# Patient Record
Sex: MALE | Race: WHITE | NOT HISPANIC OR LATINO | Employment: FULL TIME | URBAN - NONMETROPOLITAN AREA
[De-identification: names, ages, dates, MRNs, and addresses within clinical notes are randomized per-mention and may not be internally consistent; named-entity substitution may affect disease eponyms.]

---

## 2017-04-14 ENCOUNTER — COMMUNICATION - GICH (OUTPATIENT)
Dept: FAMILY MEDICINE | Facility: OTHER | Age: 52
End: 2017-04-14

## 2017-04-14 DIAGNOSIS — F52.8 OTHER SEXUAL DYSFUNCTION NOT DUE TO A SUBSTANCE OR KNOWN PHYSIOLOGICAL CONDITION: ICD-10-CM

## 2017-09-18 ENCOUNTER — COMMUNICATION - GICH (OUTPATIENT)
Dept: FAMILY MEDICINE | Facility: OTHER | Age: 52
End: 2017-09-18

## 2017-09-18 DIAGNOSIS — F52.8 OTHER SEXUAL DYSFUNCTION NOT DUE TO A SUBSTANCE OR KNOWN PHYSIOLOGICAL CONDITION: ICD-10-CM

## 2018-01-04 NOTE — TELEPHONE ENCOUNTER
Patient Information     Patient Name MRNarendra Rodríguez [id number] Male 1965      Telephone Encounter by Kerry Carpio RN at 2017 12:49 PM     Author:  Kerry Carpio RN Service:  (none) Author Type:  NURS- Registered Nurse     Filed:  2017 12:50 PM Encounter Date:  2017 Status:  Signed     :  Kerry Carpio RN (NURS- Registered Nurse)            This is a Refill request from: [facility]  Name of Medication: CIALIS 5 mg tablet  TAKE 1 TABLET BY MOUTH EVERY DAY AS NEEDED FOR ED *TAKE 30 MINUTES BEFORE SEXUAL   Quantity requested: 10  Last fill date: 16  Due for refill:   Last visit with MARTHA KING was on: 2013 in [facility]  PCP:  Martha King MD  Controlled Substance Agreement:  na   Diagnosis r/t this medication request: Psychosexual dysfunction with inhibited sexual excitement      Patient has not been seen since  RN unable to refill     Unable to complete prescription refill per RN Medication Refill Policy.................... KERRY CARPIO RN ....................  2017   12:49 PM

## 2018-01-29 ENCOUNTER — DOCUMENTATION ONLY (OUTPATIENT)
Dept: FAMILY MEDICINE | Facility: OTHER | Age: 53
End: 2018-01-29

## 2018-01-29 PROBLEM — F52.8 PSYCHOSEXUAL DYSFUNCTION WITH INHIBITED SEXUAL EXCITEMENT: Status: ACTIVE | Noted: 2018-01-29

## 2018-01-29 PROBLEM — M54.9 BACKACHE: Status: ACTIVE | Noted: 2018-01-29

## 2018-01-29 PROBLEM — G47.33 OBSTRUCTIVE SLEEP APNEA: Status: ACTIVE | Noted: 2018-01-29

## 2018-01-29 RX ORDER — TADALAFIL 5 MG/1
1 TABLET ORAL DAILY PRN
COMMUNITY
Start: 2017-04-14 | End: 2018-03-13

## 2018-01-29 RX ORDER — SILVER SULFADIAZINE 10 MG/G
3 CREAM TOPICAL DAILY
COMMUNITY
Start: 2013-12-27 | End: 2019-04-05

## 2018-03-13 DIAGNOSIS — F52.8 PSYCHOSEXUAL DYSFUNCTION WITH INHIBITED SEXUAL EXCITEMENT: Primary | ICD-10-CM

## 2018-03-16 RX ORDER — TADALAFIL 5 MG/1
5 TABLET ORAL DAILY PRN
Qty: 10 TABLET | Refills: 0 | Status: SHIPPED | OUTPATIENT
Start: 2018-03-16 | End: 2018-03-28

## 2018-03-16 NOTE — TELEPHONE ENCOUNTER
This is a Refill request from: [facility]  Name of Medication: CIALIS 5 mg tablet  TAKE 1 TABLET BY MOUTH EVERY DAY AS NEEDED FOR ED *TAKE 30 MINUTES BEFORE SEXUAL   Quantity requested:   Last fill date: 4/14/17 per chart review  Due for refill: yes  Last visit with MARTHA KING was on: 12/27/2013 in [facility]  PCP:  Martha King MD  Controlled Substance Agreement:  na   Diagnosis r/t this medication request: Psychosexual dysfunction with inhibited sexual excitement        Patient has not been seen since 2013 RN unable to refill    Kerry Carpio, RN on 3/16/2018 at 2:32 PM

## 2018-03-28 DIAGNOSIS — F52.8 PSYCHOSEXUAL DYSFUNCTION WITH INHIBITED SEXUAL EXCITEMENT: ICD-10-CM

## 2018-03-28 RX ORDER — TADALAFIL 5 MG/1
5 TABLET ORAL DAILY PRN
Qty: 10 TABLET | Refills: 0 | Status: SHIPPED | OUTPATIENT
Start: 2018-03-28 | End: 2019-04-05

## 2019-03-28 DIAGNOSIS — F52.8 PSYCHOSEXUAL DYSFUNCTION WITH INHIBITED SEXUAL EXCITEMENT: ICD-10-CM

## 2019-04-01 RX ORDER — TADALAFIL 5 MG/1
5 TABLET ORAL DAILY PRN
Qty: 10 TABLET | Refills: 0 | OUTPATIENT
Start: 2019-04-01

## 2019-04-01 NOTE — TELEPHONE ENCOUNTER
Refused. Was a patient of Dr. King. Has not been seen here since 2013. Needs to establish care. Zahida Downey RN on 4/1/2019 at 4:52 PM

## 2019-04-05 ENCOUNTER — OFFICE VISIT (OUTPATIENT)
Dept: FAMILY MEDICINE | Facility: OTHER | Age: 54
End: 2019-04-05
Attending: NURSE PRACTITIONER
Payer: COMMERCIAL

## 2019-04-05 VITALS
WEIGHT: 222.5 LBS | HEART RATE: 66 BPM | TEMPERATURE: 96.9 F | BODY MASS INDEX: 31.85 KG/M2 | HEIGHT: 70 IN | SYSTOLIC BLOOD PRESSURE: 122 MMHG | DIASTOLIC BLOOD PRESSURE: 84 MMHG | RESPIRATION RATE: 16 BRPM

## 2019-04-05 DIAGNOSIS — F52.8 PSYCHOSEXUAL DYSFUNCTION WITH INHIBITED SEXUAL EXCITEMENT: Primary | ICD-10-CM

## 2019-04-05 PROCEDURE — 99213 OFFICE O/P EST LOW 20 MIN: CPT | Performed by: NURSE PRACTITIONER

## 2019-04-05 RX ORDER — TADALAFIL 10 MG/1
10 TABLET ORAL DAILY PRN
Qty: 10 TABLET | Refills: 3 | Status: SHIPPED | OUTPATIENT
Start: 2019-04-05 | End: 2019-04-09

## 2019-04-05 ASSESSMENT — MIFFLIN-ST. JEOR: SCORE: 1870.5

## 2019-04-05 ASSESSMENT — PAIN SCALES - GENERAL: PAINLEVEL: NO PAIN (0)

## 2019-04-05 NOTE — PROGRESS NOTES
HPI:    Narendra Dee is a 53 year old male who presents to clinic today for medication management.  He was originally scheduled for a physical when he found out that I was not a physician and he opted to just get med management done and will plan to establish care with a new physician in the future.  The only medication he is currently taking Cialis.  He takes 5 mg daily as needed.  He is feeling like this is wearing off too quickly and is wondering if he can have an increased dose of this.  He denies any other concerns today.  Is not having any side effects from the medication.    Past Medical History:   Diagnosis Date     Backache     No Comments Provided     Obstructive sleep apnea     noncompliant to continuous positive airway pressure.  History of broken nose.     Vasectomy status     12/3/2010       Past Surgical History:   Procedure Laterality Date     VASECTOMY      No Comments Provided       Family History   Problem Relation Age of Onset     Seizure Disorder Sister         Seizures,seizure disorder     Other - See Comments Other         breast cancer in family     Breast Cancer Mother      Other - See Comments Father         quadriplegia, swimming accident       Social History     Socioeconomic History     Marital status:      Spouse name: Not on file     Number of children: Not on file     Years of education: Not on file     Highest education level: Not on file   Occupational History     Not on file   Social Needs     Financial resource strain: Not on file     Food insecurity:     Worry: Not on file     Inability: Not on file     Transportation needs:     Medical: Not on file     Non-medical: Not on file   Tobacco Use     Smoking status: Never Smoker     Smokeless tobacco: Current User     Types: Chew   Substance and Sexual Activity     Alcohol use: Yes     Alcohol/week: 2.5 oz     Drug use: No     Types: Other     Comment: Drug use: No     Sexual activity: Yes     Partners: Female   Lifestyle  "    Physical activity:     Days per week: Not on file     Minutes per session: Not on file     Stress: Not on file   Relationships     Social connections:     Talks on phone: Not on file     Gets together: Not on file     Attends Yazdanism service: Not on file     Active member of club or organization: Not on file     Attends meetings of clubs or organizations: Not on file     Relationship status: Not on file     Intimate partner violence:     Fear of current or ex partner: Not on file     Emotionally abused: Not on file     Physically abused: Not on file     Forced sexual activity: Not on file   Other Topics Concern     Not on file   Social History Narrative    Haja Son    Adam Son    , HX of army medic, 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 Denies abuse.  Infrequent use of alcohol.       Current Outpatient Medications   Medication Sig Dispense Refill     tadalafil (CIALIS) 10 MG tablet Take 1 tablet (10 mg) by mouth daily as needed (erctile dysfunction) 10 tablet 3       No Known Allergies    ROS:  Pertinent positives and negatives are noted in HPI.    EXAM:  /84 (BP Location: Right arm, Patient Position: Sitting, Cuff Size: Adult Regular)   Pulse 66   Temp 96.9  F (36.1  C) (Tympanic)   Resp 16   Ht 1.778 m (5' 10\")   Wt 100.9 kg (222 lb 8 oz)   BMI 31.93 kg/m    General appearance: well appearing male, in no acute distress  Respiratory: clear to auscultation bilaterally  Cardiac: RRR with no murmurs  Psychological: normal affect, alert and pleasant      ASSESSMENT AND PLAN:    1. Psychosexual dysfunction with inhibited sexual excitement      I did increase his Cialis to 10 mg daily as needed.  I gave him a prescription for 10 tablets with 3 refills.  He is due for routine physical with labs and colonoscopy.  He will schedule this at his convenience in the future.      Mady Lara..................4/5/2019 2:43 PM      This document was prepared using voice generated " software.  While every attempt was made for accuracy, grammatical errors may exist.

## 2019-04-05 NOTE — NURSING NOTE
Patient presents to clinic today for a physical and to get medication refilled.     No LMP for male patient.  Medication Reconciliation: complete    Aidee Paulson LPN  4/5/2019 2:09 PM

## 2019-04-08 ENCOUNTER — TELEPHONE (OUTPATIENT)
Dept: FAMILY MEDICINE | Facility: OTHER | Age: 54
End: 2019-04-08

## 2019-04-08 DIAGNOSIS — F52.8 PSYCHOSEXUAL DYSFUNCTION WITH INHIBITED SEXUAL EXCITEMENT: Primary | ICD-10-CM

## 2019-04-09 RX ORDER — TADALAFIL 20 MG/1
TABLET ORAL
Qty: 10 TABLET | Refills: 3 | Status: SHIPPED | OUTPATIENT
Start: 2019-04-09 | End: 2020-10-02

## 2019-04-09 NOTE — TELEPHONE ENCOUNTER
Patient's wife calling in regards to prescription that was sent for . She states it is going to cost well over $300. Pharmacy told her if dose was increased to 20mg for them to cut in half it would be much cheaper. I stated I would send message to Mady MAYES and give her a call back.     Aidee Paulson LPN...................4/9/2019  10:39 AM

## 2019-04-09 NOTE — TELEPHONE ENCOUNTER
Spoke with patient's wife and relayed message below.  Aidee Paulson LPN...................4/9/2019  10:49 AM

## 2019-12-05 ENCOUNTER — APPOINTMENT (OUTPATIENT)
Age: 54
Setting detail: DERMATOLOGY
End: 2019-12-05

## 2019-12-05 VITALS — RESPIRATION RATE: 15 BRPM | HEIGHT: 69 IN | WEIGHT: 208 LBS

## 2019-12-05 DIAGNOSIS — L82.1 OTHER SEBORRHEIC KERATOSIS: ICD-10-CM

## 2019-12-05 DIAGNOSIS — L82.0 INFLAMED SEBORRHEIC KERATOSIS: ICD-10-CM

## 2019-12-05 PROCEDURE — OTHER COUNSELING: OTHER

## 2019-12-05 PROCEDURE — 17110 DESTRUCT B9 LESION 1-14: CPT

## 2019-12-05 PROCEDURE — OTHER BENIGN DESTRUCTION: OTHER

## 2019-12-05 PROCEDURE — OTHER REASSURANCE: OTHER

## 2019-12-05 PROCEDURE — 99202 OFFICE O/P NEW SF 15 MIN: CPT | Mod: 25

## 2019-12-05 ASSESSMENT — LOCATION DETAILED DESCRIPTION DERM: LOCATION DETAILED: LEFT SUPERIOR PARIETAL SCALP

## 2019-12-05 ASSESSMENT — LOCATION SIMPLE DESCRIPTION DERM: LOCATION SIMPLE: SCALP

## 2019-12-05 ASSESSMENT — LOCATION ZONE DERM: LOCATION ZONE: SCALP

## 2019-12-05 NOTE — PROCEDURE: BENIGN DESTRUCTION
Detail Level: Detailed
Render Post-Care Instructions In Note?: no
Treatment Number (Will Not Render If 0): 0
Post-Care Instructions: I reviewed with the patient in detail post-care instructions. Patient is to wear sunprotection, and avoid picking at any of the treated lesions. Pt may apply Vaseline to crusted or scabbing areas.
Medical Necessity Clause: This procedure was medically necessary because the lesions that were treated were:
Consent: The patient's consent was obtained including but not limited to risks of crusting, scabbing, blistering, scarring, darker or lighter pigmentary change, recurrence, incomplete removal and infection.
Anesthesia Volume In Cc: 0.5
Medical Necessity Information: It is in your best interest to select a reason for this procedure from the list below. All of these items fulfill various CMS LCD requirements except the new and changing color options.

## 2020-09-24 DIAGNOSIS — F52.8 PSYCHOSEXUAL DYSFUNCTION WITH INHIBITED SEXUAL EXCITEMENT: ICD-10-CM

## 2020-09-28 RX ORDER — TADALAFIL 20 MG/1
TABLET ORAL
Qty: 10 TABLET | Refills: 0 | OUTPATIENT
Start: 2020-09-28

## 2020-09-28 NOTE — TELEPHONE ENCOUNTER
[facility] sent Rx request for the following:   tadalafil (CIALIS) 20 MG tablet  Sig:  TAKE 1/2 (ONE-HALF) TABLET BY MOUTH ONCE DAILY AS NEEDED    Last Prescription Date:   4/9/2019  Last Fill Qty/Refills:         10, R-3    Last Office Visit:              4/5/2019   Future Office visit:           None  Failed:  Erectile Dysfuction Protocol Failed  Recent (12 mo) or future (30 days) visit within the authorizing provider's specialty     As per OV 4/5/2019 Pt is to be seen.      Will refuse at this time with note to pharmacy    Lyla Rodriguez RN  ....................  9/28/2020   1:58 PM

## 2020-10-02 ENCOUNTER — OFFICE VISIT (OUTPATIENT)
Dept: FAMILY MEDICINE | Facility: OTHER | Age: 55
End: 2020-10-02
Attending: PHYSICIAN ASSISTANT
Payer: COMMERCIAL

## 2020-10-02 VITALS
OXYGEN SATURATION: 96 % | DIASTOLIC BLOOD PRESSURE: 72 MMHG | TEMPERATURE: 97.9 F | HEART RATE: 70 BPM | BODY MASS INDEX: 31.45 KG/M2 | SYSTOLIC BLOOD PRESSURE: 128 MMHG | RESPIRATION RATE: 14 BRPM | WEIGHT: 219.2 LBS

## 2020-10-02 DIAGNOSIS — F52.8 PSYCHOSEXUAL DYSFUNCTION WITH INHIBITED SEXUAL EXCITEMENT: ICD-10-CM

## 2020-10-02 DIAGNOSIS — Z00.00 LABORATORY EXAMINATION ORDERED AS PART OF A ROUTINE GENERAL MEDICAL EXAMINATION: Primary | ICD-10-CM

## 2020-10-02 PROCEDURE — 99213 OFFICE O/P EST LOW 20 MIN: CPT | Performed by: PHYSICIAN ASSISTANT

## 2020-10-02 PROCEDURE — G0463 HOSPITAL OUTPT CLINIC VISIT: HCPCS

## 2020-10-02 PROCEDURE — G0463 HOSPITAL OUTPT CLINIC VISIT: HCPCS | Mod: 25

## 2020-10-02 RX ORDER — TADALAFIL 20 MG/1
TABLET ORAL
Qty: 10 TABLET | Refills: 3 | Status: SHIPPED | OUTPATIENT
Start: 2020-10-02 | End: 2021-12-23

## 2020-10-02 ASSESSMENT — PATIENT HEALTH QUESTIONNAIRE - PHQ9: 5. POOR APPETITE OR OVEREATING: NOT AT ALL

## 2020-10-02 ASSESSMENT — ANXIETY QUESTIONNAIRES
6. BECOMING EASILY ANNOYED OR IRRITABLE: NOT AT ALL
GAD7 TOTAL SCORE: 0
2. NOT BEING ABLE TO STOP OR CONTROL WORRYING: NOT AT ALL
IF YOU CHECKED OFF ANY PROBLEMS ON THIS QUESTIONNAIRE, HOW DIFFICULT HAVE THESE PROBLEMS MADE IT FOR YOU TO DO YOUR WORK, TAKE CARE OF THINGS AT HOME, OR GET ALONG WITH OTHER PEOPLE: NOT DIFFICULT AT ALL
3. WORRYING TOO MUCH ABOUT DIFFERENT THINGS: NOT AT ALL
1. FEELING NERVOUS, ANXIOUS, OR ON EDGE: NOT AT ALL
5. BEING SO RESTLESS THAT IT IS HARD TO SIT STILL: NOT AT ALL
7. FEELING AFRAID AS IF SOMETHING AWFUL MIGHT HAPPEN: NOT AT ALL

## 2020-10-02 ASSESSMENT — PAIN SCALES - GENERAL: PAINLEVEL: NO PAIN (0)

## 2020-10-02 NOTE — PROGRESS NOTES
SUBJECTIVE:   Narendra Dee is a 54 year old male here for the following health issues:    HPI  Patient comes in for refill of tadalafil.  No side effects and usually breaks them in half and states this dose is perfect for him.  Still working well for him.      Also, he has not had a yearly physical exam or lab work in several years.  He is requesting lab only appointment and will make a yearly physical exam appointment shortly.    States he is recently put on a little bit of weight is resting heart rate is now 70 when it used to be in the low 50s and even 45.  No other changes to shortness of breath, chest pain, tachycardia, changes in appetite, changes in stool.    GAD7  SHAINA-7 SCORE 10/2/2020   Total Score 0     PHQ9  No flowsheet data found.    Allergies:  No Known Allergies    Review of Systems   As above otherwise ROS is unremarkable.     OBJECTIVE:     Vitals:    10/02/20 0901   BP: 128/72   Pulse: 70   Resp: 14   Temp: 97.9  F (36.6  C)   SpO2: 96%   Weight: 99.4 kg (219 lb 3.2 oz)       Physical Exam  General Appearance: Pleasant, alert, appropriate appearance for age and circumstances, no acute distress  Head: Normocephalic, atraumatic  Psychiatric Exam: Alert and oriented, appropriate affect    ASSESSMENT/PLAN:     1. Laboratory examination ordered as part of a routine general medical examination    2. Psychosexual dysfunction with inhibited sexual excitement        Told patient to make a lab only appointment for yearly lab work and he will.    Refilled his tadalafil.    He is going to follow-up by making a yearly medical exam appointment in the near future.    He was in a hurry to get back to work.    TED Arora  [facility]    This document was prepared using voice generated software.  While every attempt was made for accuracy, grammatical errors may exist.

## 2020-10-03 ASSESSMENT — ANXIETY QUESTIONNAIRES: GAD7 TOTAL SCORE: 0

## 2021-12-16 DIAGNOSIS — F52.8 PSYCHOSEXUAL DYSFUNCTION WITH INHIBITED SEXUAL EXCITEMENT: ICD-10-CM

## 2021-12-20 RX ORDER — TADALAFIL 20 MG/1
TABLET ORAL
Qty: 10 TABLET | Refills: 0 | OUTPATIENT
Start: 2021-12-20

## 2021-12-20 NOTE — TELEPHONE ENCOUNTER
"[id number] sent Rx request for the following:      Requested Prescriptions   Pending Prescriptions Disp Refills     tadalafil (CIALIS) 20 MG tablet [Pharmacy Med Name: Tadalafil 20 MG Oral Tablet] 10 tablet 0     Sig: TAKE 1/2 (ONE-HALF) TABLET BY MOUTH ONCE DAILY AS NEEDED       Erectile Dysfuction Protocol Failed - 12/20/2021  1:55 PM        Failed - Recent (12 mo) or future (30 days) visit within the authorizing provider's specialty     Patient has had an office visit with the authorizing provider or a provider within the authorizing providers department within the previous 12 mos or has a future within next 30 days. See \"Patient Info\" tab in inbasket, or \"Choose Columns\" in Meds & Orders section of the refill encounter.                Last Prescription Date:   10/2/2020   Last Fill Qty/Refills:         10, R-3    Last Office Visit:              10/2/2020  Future Office visit:           None  Routing refill request to provider for review/approval because:    Does not meet RN refill criteria as patient has not had a visit in the last year. Mireya Escoto RN on 12/20/2021 at 2:20 PM            "

## 2021-12-23 ENCOUNTER — OFFICE VISIT (OUTPATIENT)
Dept: FAMILY MEDICINE | Facility: OTHER | Age: 56
End: 2021-12-23
Attending: FAMILY MEDICINE
Payer: COMMERCIAL

## 2021-12-23 VITALS
RESPIRATION RATE: 16 BRPM | DIASTOLIC BLOOD PRESSURE: 88 MMHG | HEART RATE: 72 BPM | HEIGHT: 70 IN | SYSTOLIC BLOOD PRESSURE: 136 MMHG | OXYGEN SATURATION: 97 % | BODY MASS INDEX: 31.84 KG/M2 | WEIGHT: 222.4 LBS | TEMPERATURE: 97.6 F

## 2021-12-23 DIAGNOSIS — F52.8 PSYCHOSEXUAL DYSFUNCTION WITH INHIBITED SEXUAL EXCITEMENT: ICD-10-CM

## 2021-12-23 PROCEDURE — 99212 OFFICE O/P EST SF 10 MIN: CPT | Performed by: FAMILY MEDICINE

## 2021-12-23 RX ORDER — TADALAFIL 20 MG/1
TABLET ORAL
Qty: 10 TABLET | Refills: 11 | Status: SHIPPED | OUTPATIENT
Start: 2021-12-23 | End: 2023-02-21

## 2021-12-23 ASSESSMENT — PAIN SCALES - GENERAL: PAINLEVEL: NO PAIN (0)

## 2021-12-23 ASSESSMENT — MIFFLIN-ST. JEOR: SCORE: 1855.05

## 2021-12-23 NOTE — NURSING NOTE
Patient here for medication refill. No concerns today. Medication Reconciliation: complete.    Breann Negron LPN  12/23/2021 9:54 AM

## 2021-12-23 NOTE — PROGRESS NOTES
"  SUBJECTIVE:   Narendra Dee is a 56 year old male who presents to clinic today for the following health issues:      Patient arrives here for Cialis refill.  Otherwise no complaints or concerns.  Plans on making a appointment for physical sometime next year        Patient Active Problem List    Diagnosis Date Noted     Backache 01/29/2018     Priority: Medium     Overview:   mechanical       Psychosexual dysfunction with inhibited sexual excitement 01/29/2018     Priority: Medium     Obstructive sleep apnea 01/29/2018     Priority: Medium     Tobacco abuse 12/02/2011     Priority: Medium     Overview:   Spit tobacco       Past Medical History:   Diagnosis Date     Backache     No Comments Provided     Obstructive sleep apnea     noncompliant to continuous positive airway pressure.  History of broken nose.     Vasectomy status     12/3/2010      No Known Allergies    Review of Systems     OBJECTIVE:     /88 (BP Location: Right arm)   Pulse 72   Temp 97.6  F (36.4  C)   Resp 16   Ht 1.778 m (5' 10\")   Wt 100.9 kg (222 lb 6.4 oz)   SpO2 97%   BMI 31.91 kg/m    Body mass index is 31.91 kg/m .  Physical Exam  Neurological:      Mental Status: He is alert.   Psychiatric:         Mood and Affect: Mood normal.         Diagnostic Test Results:  none     ASSESSMENT/PLAN:         (F52.8) Psychosexual dysfunction with inhibited sexual excitement  Comment: Refill  Plan: tadalafil (CIALIS) 20 MG tablet       Also reviewed immunization status colonoscopy status.  Patient would like to wait until later next year to address these        Narendra Reyes MD  [facility]  "

## 2022-05-31 ENCOUNTER — APPOINTMENT (OUTPATIENT)
Dept: URBAN - METROPOLITAN AREA CLINIC 256 | Age: 57
Setting detail: DERMATOLOGY
End: 2022-05-31

## 2022-05-31 DIAGNOSIS — L82.0 INFLAMED SEBORRHEIC KERATOSIS: ICD-10-CM

## 2022-05-31 PROCEDURE — 17110 DESTRUCT B9 LESION 1-14: CPT

## 2022-05-31 PROCEDURE — OTHER COUNSELING: OTHER

## 2022-05-31 PROCEDURE — OTHER MIPS QUALITY: OTHER

## 2022-05-31 PROCEDURE — OTHER LIQUID NITROGEN: OTHER

## 2022-05-31 ASSESSMENT — LOCATION SIMPLE DESCRIPTION DERM: LOCATION SIMPLE: SCALP

## 2022-05-31 ASSESSMENT — LOCATION DETAILED DESCRIPTION DERM
LOCATION DETAILED: LEFT SUPERIOR PARIETAL SCALP
LOCATION DETAILED: LEFT CENTRAL PARIETAL SCALP

## 2022-05-31 ASSESSMENT — LOCATION ZONE DERM: LOCATION ZONE: SCALP

## 2022-05-31 NOTE — PROCEDURE: LIQUID NITROGEN
Show Aperture Variable?: Yes
Add 52 Modifier (Optional): no
Consent: The patient's consent was obtained including but not limited to risks of crusting, scabbing, blistering, scarring, darker or lighter pigmentary change, recurrence, incomplete removal and infection.
Number Of Freeze-Thaw Cycles: 3 freeze-thaw cycles
Detail Level: Detailed
Duration Of Freeze Thaw-Cycle (Seconds): 5-10
Medical Necessity Clause: This procedure was medically necessary because the lesions that were treated were:
Post-Care Instructions: I reviewed with the patient in detail post-care instructions. Patient is to wear sunprotection, and avoid picking at any of the treated lesions. Pt may apply Vaseline to crusted or scabbing areas.
Medical Necessity Information: It is in your best interest to select a reason for this procedure from the list below. All of these items fulfill various CMS LCD requirements except the new and changing color options.
Spray Paint Text: The liquid nitrogen was applied to the skin utilizing a spray paint frosting technique.

## 2023-02-15 DIAGNOSIS — F52.8 OTHER SEXUAL DYSFUNCTION NOT DUE TO A SUBSTANCE OR KNOWN PHYSIOLOGICAL CONDITION: ICD-10-CM

## 2023-02-17 NOTE — TELEPHONE ENCOUNTER
" Disp Refills Start End LYNDSEY   tadalafil (CIALIS) 20 MG tablet 10 tablet 11 12/23/2021  No   Sig: Take half tablet daily as needed         LOV: 12/23/2021  Future Office visit: No future appointment scheduled at this time.    Routing refill request to provider for review/approval.  Overdue for annual appointment. Routed to scheduling to get patient scheduled. Nallely Li RN  ....................  2/17/2023   3:59 PM          Requested Prescriptions   Pending Prescriptions Disp Refills     tadalafil (CIALIS) 20 MG tablet [Pharmacy Med Name: Tadalafil 20 MG Oral Tablet] 10 tablet 0     Sig: TAKE 1/2 (ONE-HALF) TABLET BY MOUTH ONCE DAILY AS NEEDED       Erectile Dysfuction Protocol Failed - 2/15/2023 11:23 AM        Failed - Recent (12 mo) or future (30 days) visit within the authorizing provider's specialty     Patient has had an office visit with the authorizing provider or a provider within the authorizing providers department within the previous 12 mos or has a future within next 30 days. See \"Patient Info\" tab in inbasket, or \"Choose Columns\" in Meds & Orders section of the refill encounter.              Passed - Absence of nitrates on medication list        Passed - Absence of Alpha Blockers on Med list        Passed - Medication is active on med list        Passed - Patient is age 18 or older           Routed to provider for review and consideration. Nallely Li RN  ....................  2/17/2023   3:55 PM      "

## 2023-02-21 RX ORDER — TADALAFIL 20 MG/1
TABLET ORAL
Qty: 10 TABLET | Refills: 0 | Status: SHIPPED | OUTPATIENT
Start: 2023-02-21 | End: 2023-08-07

## 2023-02-21 NOTE — TELEPHONE ENCOUNTER
Talked to patient. He is aware that he is due for an annual exam. Declined to schedule at this time.    Sara Banda on 2/21/2023 at 11:27 AM

## 2023-03-07 ENCOUNTER — APPOINTMENT (OUTPATIENT)
Dept: URBAN - METROPOLITAN AREA CLINIC 256 | Age: 58
Setting detail: DERMATOLOGY
End: 2023-03-08

## 2023-03-07 VITALS — WEIGHT: 205 LBS | HEIGHT: 70 IN

## 2023-03-07 DIAGNOSIS — D22 MELANOCYTIC NEVI: ICD-10-CM

## 2023-03-07 DIAGNOSIS — L82.0 INFLAMED SEBORRHEIC KERATOSIS: ICD-10-CM

## 2023-03-07 DIAGNOSIS — L21.8 OTHER SEBORRHEIC DERMATITIS: ICD-10-CM

## 2023-03-07 PROBLEM — D22.4 MELANOCYTIC NEVI OF SCALP AND NECK: Status: ACTIVE | Noted: 2023-03-07

## 2023-03-07 PROCEDURE — OTHER PRESCRIPTION MEDICATION MANAGEMENT: OTHER

## 2023-03-07 PROCEDURE — 17110 DESTRUCT B9 LESION 1-14: CPT

## 2023-03-07 PROCEDURE — OTHER PRESCRIPTION: OTHER

## 2023-03-07 PROCEDURE — 99213 OFFICE O/P EST LOW 20 MIN: CPT | Mod: 25

## 2023-03-07 PROCEDURE — OTHER COUNSELING: OTHER

## 2023-03-07 PROCEDURE — OTHER MIPS QUALITY: OTHER

## 2023-03-07 PROCEDURE — OTHER LIQUID NITROGEN: OTHER

## 2023-03-07 RX ORDER — BETAMETHASONE DIPROPIONATE 0.5 MG/ML
0.05% LOTION TOPICAL QD
Qty: 60 | Refills: 3 | Status: ERX | COMMUNITY
Start: 2023-03-07

## 2023-03-07 ASSESSMENT — LOCATION SIMPLE DESCRIPTION DERM
LOCATION SIMPLE: POSTERIOR NECK
LOCATION SIMPLE: POSTERIOR SCALP
LOCATION SIMPLE: RIGHT FOREHEAD

## 2023-03-07 ASSESSMENT — LOCATION DETAILED DESCRIPTION DERM
LOCATION DETAILED: MID POSTERIOR NECK
LOCATION DETAILED: POSTERIOR MID-PARIETAL SCALP
LOCATION DETAILED: RIGHT SUPERIOR OCCIPITAL SCALP
LOCATION DETAILED: RIGHT SUPERIOR MEDIAL FOREHEAD

## 2023-03-07 ASSESSMENT — LOCATION ZONE DERM
LOCATION ZONE: SCALP
LOCATION ZONE: FACE
LOCATION ZONE: NECK

## 2023-03-07 NOTE — PROCEDURE: PRESCRIPTION MEDICATION MANAGEMENT
Detail Level: Zone
Render In Strict Bullet Format?: No
Initiate Treatment: Betamethasone lotion. Apply once daily to scalp at night for up to 14 days per month. Repeat as needed for flares.

## 2023-03-07 NOTE — PROCEDURE: LIQUID NITROGEN
Duration Of Freeze Thaw-Cycle (Seconds): 5-10
Post-Care Instructions: I reviewed with the patient in detail post-care instructions. Patient is to wear sunprotection, and avoid picking at any of the treated lesions. Pt may apply Vaseline to crusted or scabbing areas.
Spray Paint Technique: No
Medical Necessity Clause: This procedure was medically necessary because the lesions that were treated were:
Spray Paint Text: The liquid nitrogen was applied to the skin utilizing a spray paint frosting technique.
Number Of Freeze-Thaw Cycles: 3 freeze-thaw cycles
Medical Necessity Information: It is in your best interest to select a reason for this procedure from the list below. All of these items fulfill various CMS LCD requirements except the new and changing color options.
Detail Level: Detailed
Show Topical Anesthesia Variable?: Yes
Consent: The patient's consent was obtained including but not limited to risks of crusting, scabbing, blistering, scarring, darker or lighter pigmentary change, recurrence, incomplete removal and infection.

## 2023-03-07 NOTE — HPI: SECONDARY COMPLAINT
How Severe Is This Condition?: mild
Additional History: He has been dealing with dandruff off and on throughout the scalp.

## 2023-07-27 LAB
ALANINE AMINOTRANSFERASE (SGPT) (U/L) IN SER/PLAS: 27 U/L (ref 10–52)
ALBUMIN (G/DL) IN SER/PLAS: 4.3 G/DL (ref 3.4–5)
ALKALINE PHOSPHATASE (U/L) IN SER/PLAS: 63 U/L (ref 33–120)
ANION GAP IN SER/PLAS: 10 MMOL/L (ref 10–20)
ASPARTATE AMINOTRANSFERASE (SGOT) (U/L) IN SER/PLAS: 32 U/L (ref 9–39)
BASOPHILS (10*3/UL) IN BLOOD BY AUTOMATED COUNT: 0.01 X10E9/L (ref 0–0.1)
BASOPHILS/100 LEUKOCYTES IN BLOOD BY AUTOMATED COUNT: 0.2 % (ref 0–2)
BILIRUBIN TOTAL (MG/DL) IN SER/PLAS: 0.4 MG/DL (ref 0–1.2)
CALCIUM (MG/DL) IN SER/PLAS: 9.4 MG/DL (ref 8.6–10.6)
CARBON DIOXIDE, TOTAL (MMOL/L) IN SER/PLAS: 30 MMOL/L (ref 21–32)
CHLORIDE (MMOL/L) IN SER/PLAS: 103 MMOL/L (ref 98–107)
CHOLESTEROL (MG/DL) IN SER/PLAS: 220 MG/DL (ref 0–199)
CHOLESTEROL IN HDL (MG/DL) IN SER/PLAS: 43.3 MG/DL
CHOLESTEROL/HDL RATIO: 5.1
CREATININE (MG/DL) IN SER/PLAS: 1.19 MG/DL (ref 0.5–1.3)
EOSINOPHILS (10*3/UL) IN BLOOD BY AUTOMATED COUNT: 0.29 X10E9/L (ref 0–0.7)
EOSINOPHILS/100 LEUKOCYTES IN BLOOD BY AUTOMATED COUNT: 6.1 % (ref 0–6)
ERYTHROCYTE DISTRIBUTION WIDTH (RATIO) BY AUTOMATED COUNT: 13.5 % (ref 11.5–14.5)
ERYTHROCYTE MEAN CORPUSCULAR HEMOGLOBIN CONCENTRATION (G/DL) BY AUTOMATED: 32.5 G/DL (ref 32–36)
ERYTHROCYTE MEAN CORPUSCULAR VOLUME (FL) BY AUTOMATED COUNT: 93 FL (ref 80–100)
ERYTHROCYTES (10*6/UL) IN BLOOD BY AUTOMATED COUNT: 4.44 X10E12/L (ref 4.5–5.9)
GFR MALE: 72 ML/MIN/1.73M2
GLUCOSE (MG/DL) IN SER/PLAS: 107 MG/DL (ref 74–99)
HEMATOCRIT (%) IN BLOOD BY AUTOMATED COUNT: 41.5 % (ref 41–52)
HEMOGLOBIN (G/DL) IN BLOOD: 13.5 G/DL (ref 13.5–17.5)
IMMATURE GRANULOCYTES/100 LEUKOCYTES IN BLOOD BY AUTOMATED COUNT: 0.2 % (ref 0–0.9)
LDL: 161 MG/DL (ref 0–99)
LEUKOCYTES (10*3/UL) IN BLOOD BY AUTOMATED COUNT: 4.7 X10E9/L (ref 4.4–11.3)
LYMPHOCYTES (10*3/UL) IN BLOOD BY AUTOMATED COUNT: 2.09 X10E9/L (ref 1.2–4.8)
LYMPHOCYTES/100 LEUKOCYTES IN BLOOD BY AUTOMATED COUNT: 44.3 % (ref 13–44)
MONOCYTES (10*3/UL) IN BLOOD BY AUTOMATED COUNT: 0.43 X10E9/L (ref 0.1–1)
MONOCYTES/100 LEUKOCYTES IN BLOOD BY AUTOMATED COUNT: 9.1 % (ref 2–10)
NEUTROPHILS (10*3/UL) IN BLOOD BY AUTOMATED COUNT: 1.89 X10E9/L (ref 1.2–7.7)
NEUTROPHILS/100 LEUKOCYTES IN BLOOD BY AUTOMATED COUNT: 40.1 % (ref 40–80)
NRBC (PER 100 WBCS) BY AUTOMATED COUNT: 0 /100 WBC (ref 0–0)
PLATELETS (10*3/UL) IN BLOOD AUTOMATED COUNT: 198 X10E9/L (ref 150–450)
POTASSIUM (MMOL/L) IN SER/PLAS: 3.8 MMOL/L (ref 3.5–5.3)
PROTEIN TOTAL: 7.4 G/DL (ref 6.4–8.2)
SODIUM (MMOL/L) IN SER/PLAS: 139 MMOL/L (ref 136–145)
THYROTROPIN (MIU/L) IN SER/PLAS BY DETECTION LIMIT <= 0.05 MIU/L: 2.05 MIU/L (ref 0.44–3.98)
THYROXINE (T4) FREE (NG/DL) IN SER/PLAS: 0.97 NG/DL (ref 0.78–1.48)
TRIGLYCERIDE (MG/DL) IN SER/PLAS: 81 MG/DL (ref 0–149)
URATE (MG/DL) IN SER/PLAS: 4.5 MG/DL (ref 4–7.5)
UREA NITROGEN (MG/DL) IN SER/PLAS: 13 MG/DL (ref 6–23)
VLDL: 16 MG/DL (ref 0–40)

## 2023-08-01 LAB
TESTOSTERONE FREE (CHAN): 94.4 PG/ML (ref 35–155)
TESTOSTERONE,TOTAL,LC-MS/MS: 472 NG/DL (ref 250–1100)

## 2023-08-07 DIAGNOSIS — F52.8 OTHER SEXUAL DYSFUNCTION NOT DUE TO A SUBSTANCE OR KNOWN PHYSIOLOGICAL CONDITION: ICD-10-CM

## 2023-08-07 RX ORDER — TADALAFIL 20 MG/1
TABLET ORAL
Qty: 3 TABLET | Refills: 0 | Status: SHIPPED | OUTPATIENT
Start: 2023-08-07 | End: 2023-08-22

## 2023-08-07 NOTE — TELEPHONE ENCOUNTER
Requested Prescriptions   Pending Prescriptions Disp Refills    tadalafil (CIALIS) 20 MG tablet 10 tablet 0     Sig: TAKE 1/2 (ONE-HALF) TABLET BY MOUTH ONCE DAILY AS NEEDED   Last Prescription Date:   2/21/23  Last Fill Qty/Refills:         10, R-0    Last Office Visit:              12/23/21   Future Office visit:             Next 5 appointments (look out 90 days)      Aug 22, 2023  9:20 AM  SHORT with Narendra Reyes MD  [facility] ([facility] ) [address]  [place] [address]  [phone]          Zahida Griggs RN .............. 8/7/2023  9:43 AM

## 2023-08-07 NOTE — TELEPHONE ENCOUNTER
Reason for call: Medication or medication refill    Name of medication requested: Cialis; looking for just a couple prior to patient's appt on 8/22/23 with [facility].    Are you out of the medication? Yes    What pharmacy do you use? [facility]    Preferred method for responding to this message: Telephone Call    Phone number patient can be reached at: Cell number on file:    Telephone Information:   Mobile [phone]       If we cannot reach you directly, may we leave a detailed response at the number you provided? Yes    Sara Banda on 8/7/2023 at 9:16 AM

## 2023-08-22 ENCOUNTER — OFFICE VISIT (OUTPATIENT)
Dept: FAMILY MEDICINE | Facility: OTHER | Age: 58
End: 2023-08-22
Attending: FAMILY MEDICINE
Payer: COMMERCIAL

## 2023-08-22 VITALS
SYSTOLIC BLOOD PRESSURE: 112 MMHG | RESPIRATION RATE: 20 BRPM | TEMPERATURE: 97.5 F | BODY MASS INDEX: 31.34 KG/M2 | WEIGHT: 218.4 LBS | HEART RATE: 74 BPM | DIASTOLIC BLOOD PRESSURE: 62 MMHG | OXYGEN SATURATION: 95 %

## 2023-08-22 DIAGNOSIS — Z12.11 SPECIAL SCREENING FOR MALIGNANT NEOPLASMS, COLON: Primary | ICD-10-CM

## 2023-08-22 DIAGNOSIS — F52.8 OTHER SEXUAL DYSFUNCTION NOT DUE TO A SUBSTANCE OR KNOWN PHYSIOLOGICAL CONDITION: ICD-10-CM

## 2023-08-22 PROCEDURE — 99213 OFFICE O/P EST LOW 20 MIN: CPT | Performed by: FAMILY MEDICINE

## 2023-08-22 RX ORDER — TADALAFIL 20 MG/1
TABLET ORAL
Qty: 30 TABLET | Refills: 4 | Status: SHIPPED | OUTPATIENT
Start: 2023-08-22 | End: 2024-09-05

## 2023-08-22 ASSESSMENT — PAIN SCALES - GENERAL: PAINLEVEL: NO PAIN (0)

## 2023-08-22 NOTE — NURSING NOTE
Patient here for medication refill. Medication Reconciliation: complete.    Breann Negron LPN  8/22/2023 9:22 AM

## 2023-08-22 NOTE — PROGRESS NOTES
SUBJECTIVE:   Narendra Dee is a 57 year old male who presents to clinic today for the following health issues: Medication refill    Patient arrives here for medication refills.  States he uses Cialis on occasion.  Review of the chart shows that he also is in need of colon screening.  After discussing colonoscopy versus Cologuard patient is willing to proceed with Cologuard.  He also is in need of immunizations and laboratory but states he will wait until the summer when he will schedule himself for complete physical              Review of Systems     OBJECTIVE:     /62   Pulse 74   Temp 97.5  F (36.4  C)   Resp 20   Wt 99.1 kg (218 lb 6.4 oz)   SpO2 95%   BMI 31.34 kg/m    Body mass index is 31.34 kg/m .  Physical Exam  Constitutional:       Appearance: Normal appearance.   Neurological:      Mental Status: He is alert.   Psychiatric:         Mood and Affect: Mood normal.         Thought Content: Thought content normal.             ASSESSMENT/PLAN:         (Z12.11) Special screening for malignant neoplasms, colon  (primary encounter diagnosis)  Comment:   Plan: COLOGUARD([employer] SCIENCES)            (F52.8) Other sexual dysfunction not due to a substance or known physiological condition  Comment: Refill  Plan: tadalafil (CIALIS) 20 MG tablet              Narendra Reyes MD  [facility] AND [facility]

## 2023-09-05 ENCOUNTER — LAB (OUTPATIENT)
Dept: FAMILY MEDICINE | Facility: OTHER | Age: 58
End: 2023-09-05
Payer: COMMERCIAL

## 2023-09-05 DIAGNOSIS — Z12.11 SPECIAL SCREENING FOR MALIGNANT NEOPLASMS, COLON: ICD-10-CM

## 2023-09-15 PROBLEM — E78.00 HYPERCHOLESTEROLEMIA: Status: ACTIVE | Noted: 2023-09-15

## 2023-09-15 PROBLEM — M75.100 ROTATOR CUFF TEAR: Status: ACTIVE | Noted: 2023-09-15

## 2023-09-15 PROBLEM — M51.16 LUMBAR DISC DISEASE WITH RADICULOPATHY: Status: ACTIVE | Noted: 2023-09-15

## 2023-09-15 PROBLEM — M10.9 ACUTE GOUTY ARTHROPATHY: Status: ACTIVE | Noted: 2023-09-15

## 2023-09-15 PROBLEM — I25.10 CAD (CORONARY ARTERY DISEASE), NATIVE CORONARY ARTERY: Status: ACTIVE | Noted: 2023-09-15

## 2023-09-15 PROBLEM — M79.18 CERVICAL MYOFASCIAL PAIN SYNDROME: Status: ACTIVE | Noted: 2023-09-15

## 2023-09-15 PROBLEM — M54.12 CERVICAL RADICULOPATHY: Status: ACTIVE | Noted: 2023-09-15

## 2023-09-15 PROBLEM — M50.30 DEGENERATIVE DISC DISEASE, CERVICAL: Status: ACTIVE | Noted: 2023-09-15

## 2023-09-15 PROBLEM — M77.9 TENDONITIS: Status: ACTIVE | Noted: 2023-09-15

## 2023-09-15 PROBLEM — I10 BENIGN ESSENTIAL HYPERTENSION: Status: ACTIVE | Noted: 2023-09-15

## 2023-09-15 PROBLEM — M96.1 LUMBAR POST-LAMINECTOMY SYNDROME: Status: ACTIVE | Noted: 2023-09-15

## 2023-09-15 RX ORDER — COLCHICINE 0.6 MG/1
1 TABLET ORAL DAILY
COMMUNITY
Start: 2022-06-27 | End: 2023-10-26 | Stop reason: SDUPTHER

## 2023-09-15 RX ORDER — HYDROCHLOROTHIAZIDE 25 MG/1
1 TABLET ORAL DAILY
COMMUNITY
Start: 2022-06-21 | End: 2023-10-26 | Stop reason: SDUPTHER

## 2023-09-15 RX ORDER — ALLOPURINOL 300 MG/1
1 TABLET ORAL DAILY
COMMUNITY
Start: 2022-08-25 | End: 2023-10-26 | Stop reason: SDUPTHER

## 2023-09-15 RX ORDER — AMLODIPINE BESYLATE 10 MG/1
1 TABLET ORAL DAILY
COMMUNITY
Start: 2022-06-21 | End: 2023-10-26 | Stop reason: SDUPTHER

## 2023-09-15 RX ORDER — EZETIMIBE 10 MG/1
1 TABLET ORAL DAILY
COMMUNITY
Start: 2022-06-21 | End: 2023-10-26 | Stop reason: SDUPTHER

## 2023-09-15 RX ORDER — ASPIRIN 81 MG/1
1 TABLET ORAL DAILY
COMMUNITY
Start: 2022-06-21 | End: 2023-10-25

## 2023-09-15 RX ORDER — PREDNISONE 20 MG/1
1 TABLET ORAL DAILY
COMMUNITY
Start: 2020-01-09 | End: 2023-10-26 | Stop reason: SDUPTHER

## 2023-09-28 LAB — COLOGUARD-ABSTRACT: NEGATIVE

## 2023-10-08 ENCOUNTER — HEALTH MAINTENANCE LETTER (OUTPATIENT)
Age: 58
End: 2023-10-08

## 2023-10-11 DIAGNOSIS — R06.02 SHORTNESS OF BREATH: ICD-10-CM

## 2023-10-11 RX ORDER — ALBUTEROL SULFATE 90 UG/1
AEROSOL, METERED RESPIRATORY (INHALATION)
COMMUNITY
Start: 2023-08-26 | End: 2023-10-11 | Stop reason: SDUPTHER

## 2023-10-12 RX ORDER — ALBUTEROL SULFATE 90 UG/1
AEROSOL, METERED RESPIRATORY (INHALATION)
Qty: 18 G | Refills: 0 | Status: SHIPPED | OUTPATIENT
Start: 2023-10-12 | End: 2023-10-26 | Stop reason: SDUPTHER

## 2023-10-25 DIAGNOSIS — I25.118 CORONARY ARTERY DISEASE OF NATIVE ARTERY OF NATIVE HEART WITH STABLE ANGINA PECTORIS (CMS-HCC): ICD-10-CM

## 2023-10-25 RX ORDER — ASPIRIN 81 MG/1
81 TABLET ORAL DAILY
Qty: 90 TABLET | Refills: 0 | Status: SHIPPED | OUTPATIENT
Start: 2023-10-25 | End: 2024-01-18 | Stop reason: SDUPTHER

## 2023-10-26 ENCOUNTER — OFFICE VISIT (OUTPATIENT)
Dept: PRIMARY CARE | Facility: CLINIC | Age: 58
End: 2023-10-26
Payer: COMMERCIAL

## 2023-10-26 ENCOUNTER — LAB (OUTPATIENT)
Dept: LAB | Facility: LAB | Age: 58
End: 2023-10-26
Payer: COMMERCIAL

## 2023-10-26 VITALS
WEIGHT: 250 LBS | BODY MASS INDEX: 33.86 KG/M2 | HEART RATE: 64 BPM | SYSTOLIC BLOOD PRESSURE: 130 MMHG | HEIGHT: 72 IN | RESPIRATION RATE: 16 BRPM | DIASTOLIC BLOOD PRESSURE: 80 MMHG

## 2023-10-26 DIAGNOSIS — N40.0 BENIGN PROSTATIC HYPERPLASIA WITHOUT LOWER URINARY TRACT SYMPTOMS: ICD-10-CM

## 2023-10-26 DIAGNOSIS — R06.02 SHORTNESS OF BREATH: ICD-10-CM

## 2023-10-26 DIAGNOSIS — S46.012D TRAUMATIC INCOMPLETE TEAR OF LEFT ROTATOR CUFF, SUBSEQUENT ENCOUNTER: ICD-10-CM

## 2023-10-26 DIAGNOSIS — I10 BENIGN ESSENTIAL HYPERTENSION: ICD-10-CM

## 2023-10-26 DIAGNOSIS — Z00.00 ANNUAL PHYSICAL EXAM: ICD-10-CM

## 2023-10-26 DIAGNOSIS — M10.9 ACUTE GOUT OF FOOT, UNSPECIFIED CAUSE, UNSPECIFIED LATERALITY: ICD-10-CM

## 2023-10-26 DIAGNOSIS — I10 HYPERTENSION, UNSPECIFIED TYPE: ICD-10-CM

## 2023-10-26 DIAGNOSIS — E03.9 ACQUIRED HYPOTHYROIDISM: ICD-10-CM

## 2023-10-26 DIAGNOSIS — M50.30 DEGENERATIVE DISC DISEASE, CERVICAL: ICD-10-CM

## 2023-10-26 DIAGNOSIS — Z00.00 ANNUAL PHYSICAL EXAM: Primary | ICD-10-CM

## 2023-10-26 DIAGNOSIS — E78.6 CHOLESTEROL DEPLETION: ICD-10-CM

## 2023-10-26 LAB
ALBUMIN SERPL BCP-MCNC: 4 G/DL (ref 3.4–5)
ALP SERPL-CCNC: 62 U/L (ref 33–120)
ALT SERPL W P-5'-P-CCNC: 23 U/L (ref 10–52)
ANION GAP SERPL CALC-SCNC: 12 MMOL/L (ref 10–20)
AST SERPL W P-5'-P-CCNC: 28 U/L (ref 9–39)
BASOPHILS # BLD AUTO: 0.02 X10*3/UL (ref 0–0.1)
BASOPHILS NFR BLD AUTO: 0.4 %
BILIRUB SERPL-MCNC: 0.3 MG/DL (ref 0–1.2)
BUN SERPL-MCNC: 13 MG/DL (ref 6–23)
CALCIUM SERPL-MCNC: 8.8 MG/DL (ref 8.6–10.6)
CHLORIDE SERPL-SCNC: 106 MMOL/L (ref 98–107)
CHOLEST SERPL-MCNC: 210 MG/DL (ref 0–199)
CHOLESTEROL/HDL RATIO: 5.2
CO2 SERPL-SCNC: 26 MMOL/L (ref 21–32)
CREAT SERPL-MCNC: 1.12 MG/DL (ref 0.5–1.3)
EOSINOPHIL # BLD AUTO: 0.32 X10*3/UL (ref 0–0.7)
EOSINOPHIL NFR BLD AUTO: 5.9 %
ERYTHROCYTE [DISTWIDTH] IN BLOOD BY AUTOMATED COUNT: 13.8 % (ref 11.5–14.5)
GFR SERPL CREATININE-BSD FRML MDRD: 78 ML/MIN/1.73M*2
GLUCOSE SERPL-MCNC: 111 MG/DL (ref 74–99)
HCT VFR BLD AUTO: 40.3 % (ref 41–52)
HDLC SERPL-MCNC: 40.7 MG/DL
HGB BLD-MCNC: 13.1 G/DL (ref 13.5–17.5)
IMM GRANULOCYTES # BLD AUTO: 0 X10*3/UL (ref 0–0.7)
IMM GRANULOCYTES NFR BLD AUTO: 0 % (ref 0–0.9)
LDLC SERPL CALC-MCNC: 144 MG/DL
LYMPHOCYTES # BLD AUTO: 2.6 X10*3/UL (ref 1.2–4.8)
LYMPHOCYTES NFR BLD AUTO: 48.2 %
MCH RBC QN AUTO: 30.5 PG (ref 26–34)
MCHC RBC AUTO-ENTMCNC: 32.5 G/DL (ref 32–36)
MCV RBC AUTO: 94 FL (ref 80–100)
MONOCYTES # BLD AUTO: 0.52 X10*3/UL (ref 0.1–1)
MONOCYTES NFR BLD AUTO: 9.6 %
NEUTROPHILS # BLD AUTO: 1.93 X10*3/UL (ref 1.2–7.7)
NEUTROPHILS NFR BLD AUTO: 35.9 %
NON HDL CHOLESTEROL: 169 MG/DL (ref 0–149)
NRBC BLD-RTO: 0 /100 WBCS (ref 0–0)
PLATELET # BLD AUTO: 193 X10*3/UL (ref 150–450)
PMV BLD AUTO: 10.8 FL (ref 7.5–11.5)
POTASSIUM SERPL-SCNC: 4 MMOL/L (ref 3.5–5.3)
PROT SERPL-MCNC: 6.9 G/DL (ref 6.4–8.2)
PSA SERPL-MCNC: 0.53 NG/ML
RBC # BLD AUTO: 4.29 X10*6/UL (ref 4.5–5.9)
SODIUM SERPL-SCNC: 140 MMOL/L (ref 136–145)
TRIGL SERPL-MCNC: 128 MG/DL (ref 0–149)
TSH SERPL-ACNC: 1.79 MIU/L (ref 0.44–3.98)
VLDL: 26 MG/DL (ref 0–40)
WBC # BLD AUTO: 5.4 X10*3/UL (ref 4.4–11.3)

## 2023-10-26 PROCEDURE — 3075F SYST BP GE 130 - 139MM HG: CPT | Performed by: INTERNAL MEDICINE

## 2023-10-26 PROCEDURE — 85025 COMPLETE CBC W/AUTO DIFF WBC: CPT

## 2023-10-26 PROCEDURE — 99396 PREV VISIT EST AGE 40-64: CPT | Performed by: INTERNAL MEDICINE

## 2023-10-26 PROCEDURE — 3079F DIAST BP 80-89 MM HG: CPT | Performed by: INTERNAL MEDICINE

## 2023-10-26 PROCEDURE — 80053 COMPREHEN METABOLIC PANEL: CPT

## 2023-10-26 PROCEDURE — 80061 LIPID PANEL: CPT

## 2023-10-26 PROCEDURE — 84443 ASSAY THYROID STIM HORMONE: CPT

## 2023-10-26 PROCEDURE — 84153 ASSAY OF PSA TOTAL: CPT

## 2023-10-26 PROCEDURE — 36415 COLL VENOUS BLD VENIPUNCTURE: CPT

## 2023-10-26 RX ORDER — AMLODIPINE BESYLATE 10 MG/1
10 TABLET ORAL DAILY
Qty: 90 TABLET | Refills: 0 | Status: SHIPPED | OUTPATIENT
Start: 2023-10-26 | End: 2023-12-15 | Stop reason: SDUPTHER

## 2023-10-26 RX ORDER — HYDROCHLOROTHIAZIDE 25 MG/1
25 TABLET ORAL DAILY
Qty: 90 TABLET | Refills: 0 | Status: SHIPPED | OUTPATIENT
Start: 2023-10-26 | End: 2024-02-15

## 2023-10-26 RX ORDER — COLCHICINE 0.6 MG/1
0.6 TABLET ORAL DAILY
Qty: 90 TABLET | Refills: 0 | Status: SHIPPED | OUTPATIENT
Start: 2023-10-26

## 2023-10-26 RX ORDER — EZETIMIBE 10 MG/1
10 TABLET ORAL DAILY
Qty: 90 TABLET | Refills: 0 | Status: SHIPPED | OUTPATIENT
Start: 2023-10-26 | End: 2024-02-15

## 2023-10-26 RX ORDER — PREDNISONE 20 MG/1
20 TABLET ORAL DAILY
Qty: 90 TABLET | Refills: 0 | Status: SHIPPED | OUTPATIENT
Start: 2023-10-26 | End: 2024-02-29

## 2023-10-26 RX ORDER — ALLOPURINOL 300 MG/1
300 TABLET ORAL DAILY
Qty: 90 TABLET | Refills: 0 | Status: SHIPPED | OUTPATIENT
Start: 2023-10-26

## 2023-10-26 RX ORDER — ALBUTEROL SULFATE 90 UG/1
AEROSOL, METERED RESPIRATORY (INHALATION)
Qty: 18 G | Refills: 0 | Status: SHIPPED | OUTPATIENT
Start: 2023-10-26 | End: 2023-10-29

## 2023-10-26 ASSESSMENT — ENCOUNTER SYMPTOMS
CONSTITUTIONAL NEGATIVE: 1
HEMATOLOGIC/LYMPHATIC NEGATIVE: 1
MUSCULOSKELETAL NEGATIVE: 1
NEUROLOGICAL NEGATIVE: 1
PSYCHIATRIC NEGATIVE: 1
RESPIRATORY NEGATIVE: 1
EYES NEGATIVE: 1
CARDIOVASCULAR NEGATIVE: 1
ENDOCRINE NEGATIVE: 1
ALLERGIC/IMMUNOLOGIC NEGATIVE: 1
GASTROINTESTINAL NEGATIVE: 1

## 2023-10-26 NOTE — ASSESSMENT & PLAN NOTE
Rotator Cuff  Syndrome - Left  Shoulder. Tender abduction/adduction and internal/external rotation of the R/L shoulder. Recommend MRI of the affected shoulder and Physical Therapy. Consider Orthopedic consult if no improvement. Also Recommend limitation of all activities targeting the overuse of the rotator cuff.

## 2023-10-26 NOTE — PROGRESS NOTES
Subjective   Patient ID: Cb Clayton is a 57 y.o. male who presents for Annual Exam (Physical  Exam).    HPI Left shoulder pain went to Urgent care and had Xrays and was negative     Review of Systems   Constitutional: Negative.    HENT: Negative.     Eyes: Negative.    Respiratory: Negative.     Cardiovascular: Negative.    Gastrointestinal: Negative.    Endocrine: Negative.    Musculoskeletal: Negative.    Skin: Negative.    Allergic/Immunologic: Negative.    Neurological: Negative.    Hematological: Negative.    Psychiatric/Behavioral: Negative.     All other systems reviewed and are negative.      Objective   Pulse 64   Ht 1.829 m (6')   Wt 113 kg (250 lb)   BMI 33.91 kg/m²   Blood pressure 130/80, pulse 64, resp. rate 16, height 1.829 m (6'), weight 113 kg (250 lb).   Physical Exam  Vitals and nursing note reviewed.   Constitutional:       Appearance: Normal appearance.   HENT:      Head: Normocephalic and atraumatic.      Right Ear: Tympanic membrane, ear canal and external ear normal.      Left Ear: Tympanic membrane, ear canal and external ear normal. There is no impacted cerumen.      Nose: Nose normal.      Mouth/Throat:      Mouth: Mucous membranes are moist.      Pharynx: Oropharynx is clear.   Eyes:      Extraocular Movements: Extraocular movements intact.      Conjunctiva/sclera: Conjunctivae normal.      Pupils: Pupils are equal, round, and reactive to light.   Cardiovascular:      Rate and Rhythm: Normal rate and regular rhythm.      Pulses: Normal pulses.      Heart sounds: Normal heart sounds. No murmur heard.  Pulmonary:      Effort: Pulmonary effort is normal. No respiratory distress.      Breath sounds: Normal breath sounds. No stridor. No wheezing, rhonchi or rales.   Chest:      Chest wall: No tenderness.   Abdominal:      General: Abdomen is flat. Bowel sounds are normal. There is no distension.      Palpations: Abdomen is soft. There is no mass.      Tenderness: There is no abdominal  tenderness. There is no right CVA tenderness, left CVA tenderness, guarding or rebound.      Hernia: No hernia is present.   Musculoskeletal:         General: Normal range of motion.      Cervical back: Normal range of motion and neck supple.   Skin:     General: Skin is warm.      Capillary Refill: Capillary refill takes less than 2 seconds.   Neurological:      General: No focal deficit present.      Mental Status: He is alert.      Cranial Nerves: No cranial nerve deficit.      Sensory: No sensory deficit.      Motor: No weakness.      Coordination: Coordination normal.      Gait: Gait normal.      Deep Tendon Reflexes: Reflexes normal.   Psychiatric:         Mood and Affect: Mood normal.         Behavior: Behavior normal. Behavior is cooperative.         Thought Content: Thought content normal.         Judgment: Judgment normal.         Assessment/Plan   Problem List Items Addressed This Visit             ICD-10-CM    Benign essential hypertension I10     HTN - hypertension well/controlled .Target BP < 130/80  achieved. Educate low salt diet and exercise with weight loss. Educate home self monitoring and diary keeping. Educate risks of elevate blood pressure and benefits of prompt treatment.  Refill           Degenerative disc disease, cervical - Primary M50.30     DDD - Degenerative disc disease of the )/Cervical (C)  spine. Educate exercises and referred patient to Physical Therapy (PT). Ordered X-Ray's of the /C spine. Consider MRI. Radiculopathy in the distribution of C4-C5-C6 nerve roots, needs NSAIDS (Naprosin 500mg BID vs. Arthrotec 75mg BID or Prednisone taper (Medrol dose pack), Flexeril 10 mg qHS, heat application, and pain control with Tylenol 325 mg TID.            Relevant Orders    Referral to Physical Therapy    Rotator cuff tear M75.100     Rotator Cuff  Syndrome - Left  Shoulder. Tender abduction/adduction and internal/external rotation of the R/L shoulder. Recommend MRI of the affected shoulder  and Physical Therapy. Consider Orthopedic consult if no improvement. Also Recommend limitation of all activities targeting the overuse of the rotator cuff.           Relevant Orders    Referral to Physical Therapy    Annual physical exam Z00.00     Overall stable and monitor blood pressure and cervical disc disease and left shoulder pain due to trauma at work     Preventive measures - Recommend ASAP : Last  Colonoscopy 2 years (educate patient risks of colon cancer) refer patient to GI specialist. Ophthalmology and retina exam recommend yearly exams refer patient to an Ophthalmologist. BPH - (Benign Prostatic Hypertrophy) refer patient to an Urologist for rectal exam and PSA check.     Refuses vaccinations of all kind         Relevant Orders    Comprehensive Metabolic Panel    CBC and Auto Differential    Acquired hypothyroidism E03.9    Relevant Orders    TSH with reflex to Free T4 if abnormal    Benign prostatic hyperplasia without lower urinary tract symptoms N40.0    Relevant Orders    Prostate Specific Antigen     Other Visit Diagnoses         Codes    Shortness of breath     R06.02    Relevant Medications    albuterol 90 mcg/actuation inhaler    predniSONE (Deltasone) 20 mg tablet    Hypertension, unspecified type     I10    Relevant Medications    allopurinol (Zyloprim) 300 mg tablet    amLODIPine (Norvasc) 10 mg tablet    hydroCHLOROthiazide (HYDRODiuril) 25 mg tablet    Acute gout of foot, unspecified cause, unspecified laterality     M10.9    Relevant Medications    colchicine, gout, 0.6 mg tablet    Cholesterol depletion     E78.6    Relevant Medications    ezetimibe (Zetia) 10 mg tablet    Other Relevant Orders    Lipid Panel            HTN - hypertension well/controlled.Target BP < 130/80 well/not achieved. Educate low salt diet and exercise with weight loss. Educate home self monitoring and diary keeping. Educate risks of elevate blood pressure and benefits of prompt treatment. Refill:       Hypercholesterolemia - Monitor lipid profile and educate patient upon risks of high cholesterol and targets. Educate diet and change in lifestyle and increase in exercises - Refill: and educate compliance with medication and diet.      Erection Disorder - Erection Disorder - Viagra/Sildenafil 100 mg qD PRN vs. Cialis 20 mg PRN vs. Levitra 20 mg PRN. Educate Exercises (walking and Jogging to Improve Pelvic Blood flow and decrease the size of the Prostate ). Also Recommend Vitamin E 400 to 800 IU qD with Meals. Avoids ETOH or ETOH abuse and control tightly the blood sugars and Diabetes to Prevent Neuropathy.      DDD - Degenerative disc disease of the )/Cervical (C) spine. Educate exercises and referred patient to Physical Therapy (PT). Ordered X-Ray's of the /C spine. Consider MRI. Radiculopathy in the distribution of C4-C5-C6 nerve roots, needs NSAIDS (Naprosin 500mg BID vs. Arthrotec 75mg BID or Prednisone taper (Medrol dose pack), Flexeril 10 mg qHS, heat application, and pain control with Tylenol vs. Vicodin 5/325 mg TID.      CAD-coronary artery disease-patient has a history of Coronary artery disease -. Target LDL should be below 70 milligrams per deciliter. Reviewed angiogram and status post stent. Follows with his cardiologist and needs a follow up heart catheterization. He needs to call us with any new symptoms of angina or shortness of breath. Last coronary catheterization/Calcium scoring was/ in. Need to address risk factors: cholesterol, blood pressure and diabetes. Educate extensively diet. Patient needs to follow in rehabilitation/mild exercises daily.     DDD - Degenerative disc disease of the Lumbo-Sacral (LS)/ spine. Educate exercises and referred patient to Physical Therapy (PT). Ordered X-Ray's of the LS/ spine. Consider MRI.      Radiculopathy in the distribution of L4-L5-S1/ nerve roots, needs NSAIDS (Naprosin 500mg BID vs. Arthrotec 75mg BID or Prednisone taper (Medrol dose pack), Flexeril 10  mg qHS, heat application, and pain control with Tylenol and Naprosyn 500 mg BID. Educate exercises and referred the patient to PT (Physical Therapy). Get X-Ray's.      Right wrsit swelling - due to arthritis vs. strain vs. gout vs. tendonitis - recommend: heat application and Voltaren gel topically and Reviewed with the patient the the X-rays and run a arthritis panel      DDD - Degenerative disc disease of the Lumbo-Sacral (LS)/ spine. Educate exercises and referred patient to Physical Therapy (PT). Ordered X-Ray's of the LS/ spine. Consider MRI.      Radiculopathy in the distribution of L4-L5-S1/ nerve roots, needs NSAIDS (Naprosin 500mg BID vs. Arthrotec 75mg BID or Prednisone taper (Medrol dose pack), Flexeril 10 mg qHS, heat application, and pain control with Tylenol and Naprosyn 500 mg BID. Educate exercises and referred the patient to PT (Physical Therapy). Get X-Ray's.      Chronic gout into the right first MCP joint - with pain and swelling and redness - educate extensively diet and start Allopurinol 300 m TID _ Colchicine 0.6 mg daily and Allopurinol 300 mg daily -= and pain control with Tylenol -      Left shoulder pain and rotator cuff syndrome possibly associated with Degenerative Joint Disease vs. Chronic Arthritis, of the right shoulder. Pain control, and antiinflammatory medications : consider Kenalog injection and start Voltaren gel 1% topically BID, and Tylenol 325 mg TID PRN. Educate exercises and referred the patient to PT (Physical Therapy). Get X-Ray's. improving symptoms and Educate extensively exercises and stretches and consider MRI of the right shoulder before Kenalog injection      Right elbow contusion - and pain -= recommend: X-rays and bracing - still has some discomfort      Erection Disorder - Viagra 100 mg qD PRN vs. Cialis 20 mg PRN vs. Levitra 20 mg PRN. Educate Exercises (walking and Jogging to Improve Pelvic Blood flow and decrease the size of the Prostate ). Also Recommend  Vitamin E 400 to 800 IU qD with Meals. Avoids ETOH or ETOH abuse and control tightly the blood sugars and Diabetes to Prevent Neuropathy.

## 2023-10-26 NOTE — ASSESSMENT & PLAN NOTE
DDD - Degenerative disc disease of the )/Cervical (C)  spine. Educate exercises and referred patient to Physical Therapy (PT). Ordered X-Ray's of the /C spine. Consider MRI. Radiculopathy in the distribution of C4-C5-C6 nerve roots, needs NSAIDS (Naprosin 500mg BID vs. Arthrotec 75mg BID or Prednisone taper (Medrol dose pack), Flexeril 10 mg qHS, heat application, and pain control with Tylenol 325 mg TID.

## 2023-10-26 NOTE — ASSESSMENT & PLAN NOTE
Overall stable and monitor blood pressure and cervical disc disease and left shoulder pain due to trauma at work     Preventive measures - Recommend ASAP : Last  Colonoscopy 2 years (educate patient risks of colon cancer) refer patient to GI specialist. Ophthalmology and retina exam recommend yearly exams refer patient to an Ophthalmologist. BPH - (Benign Prostatic Hypertrophy) refer patient to an Urologist for rectal exam and PSA check.     Refuses vaccinations of all kind

## 2023-10-29 RX ORDER — LEVALBUTEROL TARTRATE 45 UG/1
AEROSOL, METERED ORAL
Qty: 15 G | Refills: 4 | Status: SHIPPED | OUTPATIENT
Start: 2023-10-29 | End: 2024-01-25

## 2023-11-14 ENCOUNTER — TELEPHONE (OUTPATIENT)
Dept: PRIMARY CARE | Facility: CLINIC | Age: 58
End: 2023-11-14
Payer: COMMERCIAL

## 2023-11-27 ENCOUNTER — DOCUMENTATION (OUTPATIENT)
Dept: PHYSICAL THERAPY | Facility: HOSPITAL | Age: 58
End: 2023-11-27
Payer: COMMERCIAL

## 2023-11-27 NOTE — PROGRESS NOTES
Physical Therapy                 Therapy Communication Note    Patient Name: Cb Clayton  MRN: [id number]  Today's Date: 11/27/2023     Discipline: Physical Therapy    Missed Time: No Show    Comment: Patient no showed to PT intial amaliaal appt on 11/27/23 at 8:15AM.

## 2023-12-08 ENCOUNTER — TELEPHONE (OUTPATIENT)
Dept: PRIMARY CARE | Facility: CLINIC | Age: 58
End: 2023-12-08
Payer: COMMERCIAL

## 2023-12-15 DIAGNOSIS — I10 HYPERTENSION, UNSPECIFIED TYPE: ICD-10-CM

## 2023-12-15 RX ORDER — AMLODIPINE BESYLATE 10 MG/1
10 TABLET ORAL DAILY
Qty: 90 TABLET | Refills: 0 | Status: SHIPPED | OUTPATIENT
Start: 2023-12-15 | End: 2024-01-08

## 2023-12-19 ENCOUNTER — OFFICE VISIT (OUTPATIENT)
Dept: PRIMARY CARE | Facility: CLINIC | Age: 58
End: 2023-12-19
Payer: COMMERCIAL

## 2023-12-19 VITALS
HEART RATE: 72 BPM | RESPIRATION RATE: 16 BRPM | DIASTOLIC BLOOD PRESSURE: 70 MMHG | BODY MASS INDEX: 33.86 KG/M2 | HEIGHT: 72 IN | WEIGHT: 250 LBS | SYSTOLIC BLOOD PRESSURE: 120 MMHG

## 2023-12-19 DIAGNOSIS — Z92.89 HOSPITALIZATION WITHIN LAST 30 DAYS: Primary | ICD-10-CM

## 2023-12-19 DIAGNOSIS — I21.A1 TYPE 2 MYOCARDIAL INFARCTION (MULTI): ICD-10-CM

## 2023-12-19 DIAGNOSIS — E78.00 HYPERCHOLESTEROLEMIA: ICD-10-CM

## 2023-12-19 DIAGNOSIS — E11.9 CONTROLLED TYPE 2 DIABETES MELLITUS WITHOUT COMPLICATION, WITHOUT LONG-TERM CURRENT USE OF INSULIN (MULTI): ICD-10-CM

## 2023-12-19 PROBLEM — M79.602 LEFT ARM PAIN: Status: ACTIVE | Noted: 2023-09-01

## 2023-12-19 PROBLEM — E66.9 OBESITY: Status: ACTIVE | Noted: 2019-04-23

## 2023-12-19 PROBLEM — S83.207A ACUTE MENISCAL TEAR OF LEFT KNEE: Status: ACTIVE | Noted: 2019-07-18

## 2023-12-19 PROBLEM — J45.20 MILD INTERMITTENT ASTHMA WITHOUT COMPLICATION (HHS-HCC): Status: ACTIVE | Noted: 2022-02-01

## 2023-12-19 PROBLEM — M25.512 LEFT SHOULDER PAIN: Status: ACTIVE | Noted: 2023-09-01

## 2023-12-19 PROBLEM — M54.32 SCIATICA, LEFT SIDE: Status: ACTIVE | Noted: 2019-10-29

## 2023-12-19 PROBLEM — S46.912A LEFT SHOULDER STRAIN: Status: ACTIVE | Noted: 2023-09-01

## 2023-12-19 PROCEDURE — 3078F DIAST BP <80 MM HG: CPT | Performed by: INTERNAL MEDICINE

## 2023-12-19 PROCEDURE — 4004F PT TOBACCO SCREEN RCVD TLK: CPT | Performed by: INTERNAL MEDICINE

## 2023-12-19 PROCEDURE — 3074F SYST BP LT 130 MM HG: CPT | Performed by: INTERNAL MEDICINE

## 2023-12-19 PROCEDURE — 3050F LDL-C >= 130 MG/DL: CPT | Performed by: INTERNAL MEDICINE

## 2023-12-19 PROCEDURE — 99215 OFFICE O/P EST HI 40 MIN: CPT | Performed by: INTERNAL MEDICINE

## 2023-12-19 RX ORDER — CARVEDILOL 12.5 MG/1
12.5 TABLET ORAL 2 TIMES DAILY
COMMUNITY
Start: 2023-12-13

## 2023-12-19 RX ORDER — SILDENAFIL 100 MG/1
100 TABLET, FILM COATED ORAL AS NEEDED
COMMUNITY
Start: 2023-01-19

## 2023-12-19 RX ORDER — ATORVASTATIN CALCIUM 80 MG/1
1 TABLET, FILM COATED ORAL NIGHTLY
COMMUNITY
Start: 2023-12-13 | End: 2024-12-12

## 2023-12-19 RX ORDER — NITROGLYCERIN 0.4 MG/1
0.4 TABLET SUBLINGUAL EVERY 5 MIN PRN
COMMUNITY
Start: 2023-12-13

## 2023-12-19 ASSESSMENT — ENCOUNTER SYMPTOMS
PSYCHIATRIC NEGATIVE: 1
MUSCULOSKELETAL NEGATIVE: 1
CONSTITUTIONAL NEGATIVE: 1
RESPIRATORY NEGATIVE: 1
GASTROINTESTINAL NEGATIVE: 1
NEUROLOGICAL NEGATIVE: 1
ENDOCRINE NEGATIVE: 1
ALLERGIC/IMMUNOLOGIC NEGATIVE: 1
EYES NEGATIVE: 1
HEMATOLOGIC/LYMPHATIC NEGATIVE: 1
CARDIOVASCULAR NEGATIVE: 1

## 2023-12-19 NOTE — PROGRESS NOTES
Subjective   Patient ID: Cb Clayton is a 58 y.o. male who presents for Follow-up (Hospital follow up). Status Post MI 10 days ago  had chest pains has a history of CAD and had a Stent placed in the 100% blocked coronary Circumflex artery     HPI     Review of Systems   Constitutional: Negative.    HENT: Negative.     Eyes: Negative.    Respiratory: Negative.     Cardiovascular: Negative.    Gastrointestinal: Negative.    Endocrine: Negative.    Musculoskeletal: Negative.    Skin: Negative.    Allergic/Immunologic: Negative.    Neurological: Negative.    Hematological: Negative.    Psychiatric/Behavioral: Negative.     All other systems reviewed and are negative.      Objective   Ht 1.829 m (6')   Wt 113 kg (250 lb)   BMI 33.91 kg/m²   Blood pressure 120/70, pulse 72, resp. rate 16, height 1.829 m (6'), weight 113 kg (250 lb).   Physical Exam  Vitals and nursing note reviewed.   Constitutional:       Appearance: Normal appearance.   HENT:      Head: Normocephalic and atraumatic.      Right Ear: Tympanic membrane, ear canal and external ear normal.      Left Ear: Tympanic membrane, ear canal and external ear normal. There is no impacted cerumen.      Nose: Nose normal.      Mouth/Throat:      Mouth: Mucous membranes are moist.      Pharynx: Oropharynx is clear.   Eyes:      Extraocular Movements: Extraocular movements intact.      Conjunctiva/sclera: Conjunctivae normal.      Pupils: Pupils are equal, round, and reactive to light.   Cardiovascular:      Rate and Rhythm: Normal rate and regular rhythm.      Pulses: Normal pulses.      Heart sounds: Normal heart sounds. No murmur heard.  Pulmonary:      Effort: Pulmonary effort is normal. No respiratory distress.      Breath sounds: Normal breath sounds. No stridor. No wheezing, rhonchi or rales.   Chest:      Chest wall: No tenderness.   Abdominal:      General: Abdomen is flat. Bowel sounds are normal. There is no distension.      Palpations: Abdomen is soft.  There is no mass.      Tenderness: There is no abdominal tenderness. There is no right CVA tenderness, left CVA tenderness, guarding or rebound.      Hernia: No hernia is present.   Musculoskeletal:         General: Normal range of motion.      Cervical back: Normal range of motion and neck supple.   Skin:     General: Skin is warm.      Capillary Refill: Capillary refill takes less than 2 seconds.   Neurological:      General: No focal deficit present.      Mental Status: He is alert.      Cranial Nerves: No cranial nerve deficit.      Sensory: No sensory deficit.      Motor: No weakness.      Coordination: Coordination normal.      Gait: Gait normal.      Deep Tendon Reflexes: Reflexes normal.   Psychiatric:         Mood and Affect: Mood normal.         Behavior: Behavior normal. Behavior is cooperative.         Thought Content: Thought content normal.         Judgment: Judgment normal.         Assessment/Plan   Problem List Items Addressed This Visit             ICD-10-CM    Hypercholesterolemia E78.00     Hypercholesterolemia - Monitor lipid profile and educate patient upon risks of high cholesterol and targets. Educate diet and change in lifestyle and increase in exercises - Refill:  Atorvastatin   80 mg daily in light of his stent in Cx  and educate compliance with medication and diet.           Controlled type 2 diabetes mellitus without complication, without long-term current use of insulin (CMS/[facility]) E11.9     DM - NIDDM  . Reviewed with patient / Will check  HbA1c and fasting blood sugars. Educate home self monitoring and diary keeping(reviewed with patient home blood sugar levels /diary). Educate extensively low calorie diet and weight loss with exercise. Reviewed BS- diary and Rx. Regimen. [place] renal protection with ARB/ACEI.               Educate compliance with diet and Rx. And educate risks and autcomes.                                                 Myocardial infarction (CMS/[facility]) I21.9      Status Post MI 10 days ago  had chest pains has a history of CAD and had a Stent placed in the 100% blocked coronary Circumflex artery follows with cardiology and start the Atorvastatin   80 mg daily and address risk factors and educate the Patient  - educate tobacco cessation and control blood pressure and cholesterol          Relevant Medications    carvedilol (Coreg) 12.5 mg tablet    nitroglycerin (Nitrostat) 0.4 mg SL tablet    sildenafil (Viagra) 100 mg tablet    ticagrelor (Brilinta) 90 mg tablet    Hospitalization within last 30 days - Primary Z92.89     reviewed the hospital course and reconcile her medications  for CAD and Status Post MI  =- Status Post MI 10 days ago  had chest pains has a history of CAD and had a Stent placed in the 100% blocked coronary Circumflex artery             Benign essential hypertension I10        HTN - hypertension well/controlled .Target BP < 130/80  achieved. Educate low salt diet and exercise with weight loss. Educate home self monitoring and diary keeping. Educate risks of elevate blood pressure and benefits of prompt treatment.  Refill              Degenerative disc disease, cervical - Primary M50.30       DDD - Degenerative disc disease of the )/Cervical (C)  spine. Educate exercises and referred patient to Physical Therapy (PT). Ordered X-Ray's of the /C spine. Consider MRI. Radiculopathy in the distribution of C4-C5-C6 nerve roots, needs NSAIDS (Naprosin 500mg BID vs. Arthrotec 75mg BID or Prednisone taper (Medrol dose pack), Flexeril 10 mg qHS, heat application, and pain control with Tylenol 325 mg TID.               Relevant Orders     Referral to Physical Therapy     Rotator cuff tear M75.100       Rotator Cuff  Syndrome - Left  Shoulder. Tender abduction/adduction and internal/external rotation of the R/L shoulder. Recommend MRI of the affected shoulder and Physical Therapy. Consider Orthopedic consult if no improvement. Also Recommend limitation of all activities  targeting the overuse of the rotator cuff.              Relevant Orders     Referral to Physical Therapy     Annual physical exam Z00.00       Overall stable and monitor blood pressure and cervical disc disease and left shoulder pain due to trauma at work      Preventive measures - Recommend ASAP : Last  Colonoscopy 2 years (educate patient risks of colon cancer) refer patient to GI specialist. Ophthalmology and retina exam recommend yearly exams refer patient to an Ophthalmologist. BPH - (Benign Prostatic Hypertrophy) refer patient to an Urologist for rectal exam and PSA check.      Refuses vaccinations of all kind           Relevant Orders     Comprehensive Metabolic Panel     CBC and Auto Differential     Acquired hypothyroidism E03.9     Relevant Orders     TSH with reflex to Free T4 if abnormal     Benign prostatic hyperplasia without lower urinary tract symptoms N40.0     Relevant Orders     Prostate Specific Antigen      Other Visit Diagnoses           Codes     Shortness of breath     R06.02     Relevant Medications     albuterol 90 mcg/actuation inhaler     predniSONE (Deltasone) 20 mg tablet     Hypertension, unspecified type     I10     Relevant Medications     allopurinol (Zyloprim) 300 mg tablet     amLODIPine (Norvasc) 10 mg tablet     hydroCHLOROthiazide (HYDRODiuril) 25 mg tablet     Acute gout of foot, unspecified cause, unspecified laterality     M10.9     Relevant Medications     colchicine, gout, 0.6 mg tablet     Cholesterol depletion     E78.6     Relevant Medications     ezetimibe (Zetia) 10 mg tablet     Other Relevant Orders     Lipid Panel                HTN - hypertension well/controlled.Target BP < 130/80 well/not achieved. Educate low salt diet and exercise with weight loss. Educate home self monitoring and diary keeping. Educate risks of elevate blood pressure and benefits of prompt treatment. Refill:      Hypercholesterolemia - Monitor lipid profile and educate patient upon risks of  high cholesterol and targets. Educate diet and change in lifestyle and increase in exercises - Refill: and educate compliance with medication and diet.      Erection Disorder - Erection Disorder - Viagra/Sildenafil 100 mg qD PRN vs. Cialis 20 mg PRN vs. Levitra 20 mg PRN. Educate Exercises (walking and Jogging to Improve Pelvic Blood flow and decrease the size of the Prostate ). Also Recommend Vitamin E 400 to 800 IU qD with Meals. Avoids ETOH or ETOH abuse and control tightly the blood sugars and Diabetes to Prevent Neuropathy.      DDD - Degenerative disc disease of the )/Cervical (C) spine. Educate exercises and referred patient to Physical Therapy (PT). Ordered X-Ray's of the /C spine. Consider MRI. Radiculopathy in the distribution of C4-C5-C6 nerve roots, needs NSAIDS (Naprosin 500mg BID vs. Arthrotec 75mg BID or Prednisone taper (Medrol dose pack), Flexeril 10 mg qHS, heat application, and pain control with Tylenol vs. Vicodin 5/325 mg TID.      CAD-coronary artery disease-patient has a history of Coronary artery disease -. Target LDL should be below 70 milligrams per deciliter. Reviewed angiogram and status post stent. Follows with his cardiologist and needs a follow up heart catheterization. He needs to call us with any new symptoms of angina or shortness of breath. Last coronary catheterization/Calcium scoring was/ in. Need to address risk factors: cholesterol, blood pressure and diabetes. Educate extensively diet. Patient needs to follow in rehabilitation/mild exercises daily.     DDD - Degenerative disc disease of the Lumbo-Sacral (LS)/ spine. Educate exercises and referred patient to Physical Therapy (PT). Ordered X-Ray's of the LS/ spine. Consider MRI.      Radiculopathy in the distribution of L4-L5-S1/ nerve roots, needs NSAIDS (Naprosin 500mg BID vs. Arthrotec 75mg BID or Prednisone taper (Medrol dose pack), Flexeril 10 mg qHS, heat application, and pain control with Tylenol and Naprosyn 500 mg BID.  Educate exercises and referred the patient to PT (Physical Therapy). Get X-Ray's.      Right wrsit swelling - due to arthritis vs. strain vs. gout vs. tendonitis - recommend: heat application and Voltaren gel topically and Reviewed with the patient the the X-rays and run a arthritis panel      DDD - Degenerative disc disease of the Lumbo-Sacral (LS)/ spine. Educate exercises and referred patient to Physical Therapy (PT). Ordered X-Ray's of the LS/ spine. Consider MRI.      Radiculopathy in the distribution of L4-L5-S1/ nerve roots, needs NSAIDS (Naprosin 500mg BID vs. Arthrotec 75mg BID or Prednisone taper (Medrol dose pack), Flexeril 10 mg qHS, heat application, and pain control with Tylenol and Naprosyn 500 mg BID. Educate exercises and referred the patient to PT (Physical Therapy). Get X-Ray's.      Chronic gout into the right first MCP joint - with pain and swelling and redness - educate extensively diet and start Allopurinol 300 m TID _ Colchicine 0.6 mg daily and Allopurinol 300 mg daily -= and pain control with Tylenol -      Left shoulder pain and rotator cuff syndrome possibly associated with Degenerative Joint Disease vs. Chronic Arthritis, of the right shoulder. Pain control, and antiinflammatory medications : consider Kenalog injection and start Voltaren gel 1% topically BID, and Tylenol 325 mg TID PRN. Educate exercises and referred the patient to PT (Physical Therapy). Get X-Ray's. improving symptoms and Educate extensively exercises and stretches and consider MRI of the right shoulder before Kenalog injection      Right elbow contusion - and pain -= recommend: X-rays and bracing - still has some discomfort      Erection Disorder - Viagra 100 mg qD PRN vs. Cialis 20 mg PRN vs. Levitra 20 mg PRN. Educate Exercises (walking and Jogging to Improve Pelvic Blood flow and decrease the size of the Prostate ). Also Recommend Vitamin E 400 to 800 IU qD with Meals. Avoids ETOH or ETOH abuse and control tightly  the blood sugars and Diabetes to Prevent Neuropathy.                  Immunizations/Injections     [facility] Purple Cap SARS-CoV-211/3/2021, 10/5/2021  Pneumococcal polysaccharide vaccine, 23-valent, age 2 years and older (PNEUMOVAX 23)8/17/2016

## 2023-12-19 NOTE — ASSESSMENT & PLAN NOTE
Hypercholesterolemia - Monitor lipid profile and educate patient upon risks of high cholesterol and targets. Educate diet and change in lifestyle and increase in exercises - Refill:  Atorvastatin   80 mg daily in light of his stent in Cx  and educate compliance with medication and diet.

## 2023-12-19 NOTE — ASSESSMENT & PLAN NOTE
Status Post MI 10 days ago  had chest pains has a history of CAD and had a Stent placed in the 100% blocked coronary Circumflex artery follows with cardiology and start the Atorvastatin   80 mg daily and address risk factors and educate the Patient  - educate tobacco cessation and control blood pressure and cholesterol

## 2023-12-19 NOTE — ASSESSMENT & PLAN NOTE
DM - NIDDM  . Reviewed with patient / Will check  HbA1c and fasting blood sugars. Educate home self monitoring and diary keeping(reviewed with patient home blood sugar levels /diary). Educate extensively low calorie diet and weight loss with exercise. Reviewed BS- diary and Rx. Regimen. [place] renal protection with ARB/ACEI.               Educate compliance with diet and Rx. And educate risks and autcomes.

## 2023-12-19 NOTE — ASSESSMENT & PLAN NOTE
reviewed the hospital course and reconcile her medications  for CAD and Status Post MI  =- Status Post MI 10 days ago  had chest pains has a history of CAD and had a Stent placed in the 100% blocked coronary Circumflex artery

## 2023-12-27 ENCOUNTER — TELEPHONE (OUTPATIENT)
Dept: PRIMARY CARE | Facility: CLINIC | Age: 58
End: 2023-12-27
Payer: COMMERCIAL

## 2023-12-29 NOTE — TELEPHONE ENCOUNTER
Returned call to pt. Informed him that paperwork was rec'd on 12-27-23 and Dr. Greenwood was already out of the office. Paperwork will be filled out and signed when the doctor returns to the office 1-3-24.

## 2024-01-02 ENCOUNTER — TELEPHONE (OUTPATIENT)
Dept: PRIMARY CARE | Facility: CLINIC | Age: 59
End: 2024-01-02
Payer: COMMERCIAL

## 2024-01-05 NOTE — TELEPHONE ENCOUNTER
Spoke with pt. Pt states that his cardiologist has filled out the paperwork and it is taken care of

## 2024-01-08 DIAGNOSIS — I10 HYPERTENSION, UNSPECIFIED TYPE: ICD-10-CM

## 2024-01-08 RX ORDER — AMLODIPINE BESYLATE 10 MG/1
10 TABLET ORAL DAILY
Qty: 90 TABLET | Refills: 0 | Status: SHIPPED | OUTPATIENT
Start: 2024-01-08 | End: 2024-02-01 | Stop reason: SDUPTHER

## 2024-01-12 ENCOUNTER — TELEPHONE (OUTPATIENT)
Dept: PRIMARY CARE | Facility: CLINIC | Age: 59
End: 2024-01-12
Payer: COMMERCIAL

## 2024-01-12 NOTE — TELEPHONE ENCOUNTER
Called pt in regards to confirming his appt for tomorrow. No answer. LVM to remind pt to come 5-10 mins early to get through the check in process.    YES HE NEEDS TO CONTINUE THE MEDICATIONS AND ASK CARDIOLOGY TO ADVISE HIM AND EXAMINE HIM

## 2024-01-18 ENCOUNTER — OFFICE VISIT (OUTPATIENT)
Dept: PRIMARY CARE | Facility: CLINIC | Age: 59
End: 2024-01-18
Payer: COMMERCIAL

## 2024-01-18 VITALS
DIASTOLIC BLOOD PRESSURE: 80 MMHG | RESPIRATION RATE: 16 BRPM | BODY MASS INDEX: 33.59 KG/M2 | HEART RATE: 64 BPM | HEIGHT: 72 IN | OXYGEN SATURATION: 97 % | SYSTOLIC BLOOD PRESSURE: 135 MMHG | WEIGHT: 248 LBS

## 2024-01-18 DIAGNOSIS — E78.00 HYPERCHOLESTEROLEMIA: ICD-10-CM

## 2024-01-18 DIAGNOSIS — M51.16 LUMBAR DISC DISEASE WITH RADICULOPATHY: ICD-10-CM

## 2024-01-18 DIAGNOSIS — E11.9 CONTROLLED TYPE 2 DIABETES MELLITUS WITHOUT COMPLICATION, WITHOUT LONG-TERM CURRENT USE OF INSULIN (MULTI): ICD-10-CM

## 2024-01-18 DIAGNOSIS — I10 BENIGN ESSENTIAL HYPERTENSION: Primary | ICD-10-CM

## 2024-01-18 DIAGNOSIS — I25.118 CORONARY ARTERY DISEASE OF NATIVE ARTERY OF NATIVE HEART WITH STABLE ANGINA PECTORIS (CMS-HCC): ICD-10-CM

## 2024-01-18 PROBLEM — S46.912A STRAIN OF UNSPECIFIED MUSCLE, FASCIA AND TENDON AT SHOULDER AND UPPER ARM LEVEL, LEFT ARM, INITIAL ENCOUNTER: Status: ACTIVE | Noted: 2023-09-01

## 2024-01-18 PROCEDURE — 3075F SYST BP GE 130 - 139MM HG: CPT | Performed by: INTERNAL MEDICINE

## 2024-01-18 PROCEDURE — 99214 OFFICE O/P EST MOD 30 MIN: CPT | Performed by: INTERNAL MEDICINE

## 2024-01-18 PROCEDURE — 3079F DIAST BP 80-89 MM HG: CPT | Performed by: INTERNAL MEDICINE

## 2024-01-18 RX ORDER — EVOLOCUMAB 140 MG/ML
INJECTION, SOLUTION SUBCUTANEOUS
COMMUNITY
Start: 2024-01-17

## 2024-01-18 RX ORDER — ALBUTEROL SULFATE 90 UG/1
2 AEROSOL, METERED RESPIRATORY (INHALATION) EVERY 6 HOURS PRN
COMMUNITY
End: 2024-02-01 | Stop reason: SDUPTHER

## 2024-01-18 RX ORDER — ASPIRIN 81 MG/1
81 TABLET ORAL DAILY
Qty: 90 TABLET | Refills: 0 | Status: SHIPPED | OUTPATIENT
Start: 2024-01-18 | End: 2024-02-15

## 2024-01-18 ASSESSMENT — ENCOUNTER SYMPTOMS
HEMATOLOGIC/LYMPHATIC NEGATIVE: 1
RESPIRATORY NEGATIVE: 1
EYES NEGATIVE: 1
GASTROINTESTINAL NEGATIVE: 1
CARDIOVASCULAR NEGATIVE: 1
ENDOCRINE NEGATIVE: 1
CONSTITUTIONAL NEGATIVE: 1
NEUROLOGICAL NEGATIVE: 1
ALLERGIC/IMMUNOLOGIC NEGATIVE: 1
MUSCULOSKELETAL NEGATIVE: 1
PSYCHIATRIC NEGATIVE: 1

## 2024-01-18 NOTE — ASSESSMENT & PLAN NOTE
DM - NIDDM  . Reviewed with patient / Will check  HbA1c and fasting blood sugars. Educate home self monitoring and diary keeping(reviewed with patient home blood sugar levels /diary). Educate extensively low calorie diet and weight loss with exercise. Reviewed BS- diary and Rx. Regimen. [place] renal protection with ARB/ACEI.               Educate compliance with diet and Rx. And educate risks and autcomes.

## 2024-01-18 NOTE — PROGRESS NOTES
Subjective   Patient ID: Cb Clayton is a 58 y.o. male who presents for Follow-up (1 month follow up visit).    HPI     Review of Systems   Constitutional: Negative.    HENT: Negative.     Eyes: Negative.    Respiratory: Negative.     Cardiovascular: Negative.    Gastrointestinal: Negative.    Endocrine: Negative.    Musculoskeletal: Negative.    Skin: Negative.    Allergic/Immunologic: Negative.    Neurological: Negative.    Hematological: Negative.    Psychiatric/Behavioral: Negative.     All other systems reviewed and are negative.      Objective   Pulse 64   Ht 1.829 m (6')   Wt 112 kg (248 lb)   SpO2 97%   BMI 33.63 kg/m²   Blood pressure 135/80, pulse 64, resp. rate 16, height 1.829 m (6'), weight 112 kg (248 lb), SpO2 97 %.     Physical Exam  Vitals and nursing note reviewed.   Constitutional:       Appearance: Normal appearance.   HENT:      Head: Normocephalic and atraumatic.      Right Ear: Tympanic membrane, ear canal and external ear normal.      Left Ear: Tympanic membrane, ear canal and external ear normal. There is no impacted cerumen.      Nose: Nose normal.      Mouth/Throat:      Mouth: Mucous membranes are moist.      Pharynx: Oropharynx is clear.   Eyes:      Extraocular Movements: Extraocular movements intact.      Conjunctiva/sclera: Conjunctivae normal.      Pupils: Pupils are equal, round, and reactive to light.   Cardiovascular:      Rate and Rhythm: Normal rate and regular rhythm.      Pulses: Normal pulses.      Heart sounds: Normal heart sounds. No murmur heard.  Pulmonary:      Effort: Pulmonary effort is normal. No respiratory distress.      Breath sounds: Normal breath sounds. No stridor. No wheezing, rhonchi or rales.   Chest:      Chest wall: No tenderness.   Abdominal:      General: Abdomen is flat. Bowel sounds are normal. There is no distension.      Palpations: Abdomen is soft. There is no mass.      Tenderness: There is no abdominal tenderness. There is no right CVA  tenderness, left CVA tenderness, guarding or rebound.      Hernia: No hernia is present.   Musculoskeletal:         General: Normal range of motion.      Cervical back: Normal range of motion and neck supple.   Skin:     General: Skin is warm.      Capillary Refill: Capillary refill takes less than 2 seconds.   Neurological:      General: No focal deficit present.      Mental Status: He is alert.      Cranial Nerves: No cranial nerve deficit.      Sensory: No sensory deficit.      Motor: No weakness.      Coordination: Coordination normal.      Gait: Gait normal.      Deep Tendon Reflexes: Reflexes normal.   Psychiatric:         Mood and Affect: Mood normal.         Behavior: Behavior normal. Behavior is cooperative.         Thought Content: Thought content normal.         Judgment: Judgment normal.         Assessment/Plan   Problem List Items Addressed This Visit             ICD-10-CM    Benign essential hypertension - Primary I10     HTN - hypertension well/controlled .Target BP < 130/80  achieved. Educate low salt diet and exercise with weight loss. Educate home self monitoring and diary keeping. Educate risks of elevate blood pressure and benefits of prompt treatment.  Refill          CAD (coronary artery disease), native coronary artery I25.10     CAD-coronary artery disease-patient has a history of myocardial infarction/stent placed in stenosed coronary arteries. Target LDL should be below 70 milligrams per deciliter. Reviewed EKG which shows no significant changes. Follows with his cardiologist/ Dr. Meadows. He needs to call us with any new symptoms of angina or shortness of breath. Last stress test was/last coronary catheterization was/. Need to address risk factors BioCore controlling cholesterol blood pressure and diabetes. Educate extensively diet. Patient needs to follow in rehabilitation/mild exercises daily.           Relevant Medications    aspirin 81 mg EC tablet    Lumbar disc disease with  radiculopathy M51.16     DDD - Degenerative disc disease of the Lumbo-Sacral (LS)/Cervical (C)  spine. Educate exercises and referred patient to Physical Therapy (PT). Ordered X-Ray's of the LS/C spine. Consider MRI. Radiculopathy in the distribution of L4-L5-S1/C3-C4-C5 nerve roots, needs NSAIDS (Naprosin 500mg BID vs. Arthrotec 75mg BID or Prednisone taper (Medrol dose pack), Flexeril 10 mg qHS, heat application, and pain control with Tylenol vs. Vicodin 5/325 mg TID.           Hypercholesterolemia E78.00     Hypercholesterolemia - Monitor lipid profile and educate patient upon risks of high cholesterol and targets. Educate diet and change in lifestyle and increase in exercises - Refill:  Atorvastatin   80 mg daily in light of his stent in Cx  and educate compliance with medication and diet.            Controlled type 2 diabetes mellitus without complication, without long-term current use of insulin (CMS/HCC) E11.9     DM - NIDDM  . Reviewed with patient / Will check  HbA1c and fasting blood sugars. Educate home self monitoring and diary keeping(reviewed with patient home blood sugar levels /diary). Educate extensively low calorie diet and weight loss with exercise. Reviewed BS- diary and Rx. Regimen. [place] renal protection with ARB/ACEI.               Educate compliance with diet and Rx. And educate risks and autcomes.                                                    Hypercholesterolemia E78.00        Hypercholesterolemia - Monitor lipid profile and educate patient upon risks of high cholesterol and targets. Educate diet and change in lifestyle and increase in exercises - Refill:  Atorvastatin   80 mg daily in light of his stent in Cx  and educate compliance with medication and diet.             Controlled type 2 diabetes mellitus without complication, without long-term current use of insulin (CMS/HCC) E11.9       DM - NIDDM  . Reviewed with patient / Will check  HbA1c and fasting blood sugars. Educate home  self monitoring and diary keeping(reviewed with patient home blood sugar levels /diary). Educate extensively low calorie diet and weight loss with exercise. Reviewed BS- diary and Rx. Regimen. [place] renal protection with ARB/ACEI.               Educate compliance with diet and Rx. And educate risks and autcomes.                                                   Myocardial infarction (CMS/HCC) I21.9       Status Post MI 10 days ago  had chest pains has a history of CAD and had a Stent placed in the 100% blocked coronary Circumflex artery follows with cardiology and start the Atorvastatin   80 mg daily and address risk factors and educate the Patient  - educate tobacco cessation and control blood pressure and cholesterol            Relevant Medications     carvedilol (Coreg) 12.5 mg tablet     nitroglycerin (Nitrostat) 0.4 mg SL tablet     sildenafil (Viagra) 100 mg tablet     ticagrelor (Brilinta) 90 mg tablet     Hospitalization within last 30 days - Primary Z92.89       reviewed the hospital course and reconcile her medications  for CAD and Status Post MI  =- Status Post MI 10 days ago  had chest pains has a history of CAD and had a Stent placed in the 100% blocked coronary Circumflex artery                       Benign essential hypertension I10          HTN - hypertension well/controlled .Target BP < 130/80  achieved. Educate low salt diet and exercise with weight loss. Educate home self monitoring and diary keeping. Educate risks of elevate blood pressure and benefits of prompt treatment.  Refill              Degenerative disc disease, cervical - Primary M50.30        DDD - Degenerative disc disease of the )/Cervical (C)  spine. Educate exercises and referred patient to Physical Therapy (PT). Ordered X-Ray's of the /C spine. Consider MRI. Radiculopathy in the distribution of C4-C5-C6 nerve roots, needs NSAIDS (Naprosin 500mg BID vs. Arthrotec 75mg BID or Prednisone taper (Medrol dose pack), Flexeril 10 mg  qHS, heat application, and pain control with Tylenol 325 mg TID.               Relevant Orders      Referral to Physical Therapy      Rotator cuff tear M75.100        Rotator Cuff  Syndrome - Left  Shoulder. Tender abduction/adduction and internal/external rotation of the R/L shoulder. Recommend MRI of the affected shoulder and Physical Therapy. Consider Orthopedic consult if no improvement. Also Recommend limitation of all activities targeting the overuse of the rotator cuff.              Relevant Orders      Referral to Physical Therapy      Annual physical exam Z00.00        Overall stable and monitor blood pressure and cervical disc disease and left shoulder pain due to trauma at work      Preventive measures - Recommend ASAP : Last  Colonoscopy 2 years (educate patient risks of colon cancer) refer patient to GI specialist. Ophthalmology and retina exam recommend yearly exams refer patient to an Ophthalmologist. BPH - (Benign Prostatic Hypertrophy) refer patient to an Urologist for rectal exam and PSA check.      Refuses vaccinations of all kind           Relevant Orders      Comprehensive Metabolic Panel      CBC and Auto Differential      Acquired hypothyroidism E03.9      Relevant Orders      TSH with reflex to Free T4 if abnormal      Benign prostatic hyperplasia without lower urinary tract symptoms N40.0      Relevant Orders      Prostate Specific Antigen       Other Visit Diagnoses           Codes     Shortness of breath     R06.02     Relevant Medications     albuterol 90 mcg/actuation inhaler     predniSONE (Deltasone) 20 mg tablet     Hypertension, unspecified type     I10     Relevant Medications     allopurinol (Zyloprim) 300 mg tablet     amLODIPine (Norvasc) 10 mg tablet     hydroCHLOROthiazide (HYDRODiuril) 25 mg tablet     Acute gout of foot, unspecified cause, unspecified laterality     M10.9     Relevant Medications     colchicine, gout, 0.6 mg tablet     Cholesterol depletion     E78.6      Relevant Medications     ezetimibe (Zetia) 10 mg tablet     Other Relevant Orders     Lipid Panel                HTN - hypertension well/controlled.Target BP < 130/80 well/not achieved. Educate low salt diet and exercise with weight loss. Educate home self monitoring and diary keeping. Educate risks of elevate blood pressure and benefits of prompt treatment. Refill:      Hypercholesterolemia - Monitor lipid profile and educate patient upon risks of high cholesterol and targets. Educate diet and change in lifestyle and increase in exercises - Refill: and educate compliance with medication and diet.      Erection Disorder - Erection Disorder - Viagra/Sildenafil 100 mg qD PRN vs. Cialis 20 mg PRN vs. Levitra 20 mg PRN. Educate Exercises (walking and Jogging to Improve Pelvic Blood flow and decrease the size of the Prostate ). Also Recommend Vitamin E 400 to 800 IU qD with Meals. Avoids ETOH or ETOH abuse and control tightly the blood sugars and Diabetes to Prevent Neuropathy.      DDD - Degenerative disc disease of the )/Cervical (C) spine. Educate exercises and referred patient to Physical Therapy (PT). Ordered X-Ray's of the /C spine. Consider MRI. Radiculopathy in the distribution of C4-C5-C6 nerve roots, needs NSAIDS (Naprosin 500mg BID vs. Arthrotec 75mg BID or Prednisone taper (Medrol dose pack), Flexeril 10 mg qHS, heat application, and pain control with Tylenol vs. Vicodin 5/325 mg TID.      CAD-coronary artery disease-patient has a history of Coronary artery disease -. Target LDL should be below 70 milligrams per deciliter. Reviewed angiogram and status post stent. Follows with his cardiologist and needs a follow up heart catheterization. He needs to call us with any new symptoms of angina or shortness of breath. Last coronary catheterization/Calcium scoring was/ in. Need to address risk factors: cholesterol, blood pressure and diabetes. Educate extensively diet. Patient needs to follow in rehabilitation/mild  exercises daily.     DDD - Degenerative disc disease of the Lumbo-Sacral (LS)/ spine. Educate exercises and referred patient to Physical Therapy (PT). Ordered X-Ray's of the LS/ spine. Consider MRI.      Radiculopathy in the distribution of L4-L5-S1/ nerve roots, needs NSAIDS (Naprosin 500mg BID vs. Arthrotec 75mg BID or Prednisone taper (Medrol dose pack), Flexeril 10 mg qHS, heat application, and pain control with Tylenol and Naprosyn 500 mg BID. Educate exercises and referred the patient to PT (Physical Therapy). Get X-Ray's.      Right wrsit swelling - due to arthritis vs. strain vs. gout vs. tendonitis - recommend: heat application and Voltaren gel topically and Reviewed with the patient the the X-rays and run a arthritis panel      DDD - Degenerative disc disease of the Lumbo-Sacral (LS)/ spine. Educate exercises and referred patient to Physical Therapy (PT). Ordered X-Ray's of the LS/ spine. Consider MRI.      Radiculopathy in the distribution of L4-L5-S1/ nerve roots, needs NSAIDS (Naprosin 500mg BID vs. Arthrotec 75mg BID or Prednisone taper (Medrol dose pack), Flexeril 10 mg qHS, heat application, and pain control with Tylenol and Naprosyn 500 mg BID. Educate exercises and referred the patient to PT (Physical Therapy). Get X-Ray's.      Chronic gout into the right first MCP joint - with pain and swelling and redness - educate extensively diet and start Allopurinol 300 m TID _ Colchicine 0.6 mg daily and Allopurinol 300 mg daily -= and pain control with Tylenol -      Left shoulder pain and rotator cuff syndrome possibly associated with Degenerative Joint Disease vs. Chronic Arthritis, of the right shoulder. Pain control, and antiinflammatory medications : consider Kenalog injection and start Voltaren gel 1% topically BID, and Tylenol 325 mg TID PRN. Educate exercises and referred the patient to PT (Physical Therapy). Get X-Ray's. improving symptoms and Educate extensively exercises and stretches and  consider MRI of the right shoulder before Kenalog injection      Right elbow contusion - and pain -= recommend: X-rays and bracing - still has some discomfort      Erection Disorder - Viagra 100 mg qD PRN vs. Cialis 20 mg PRN vs. Levitra 20 mg PRN. Educate Exercises (walking and Jogging to Improve Pelvic Blood flow and decrease the size of the Prostate ). Also Recommend Vitamin E 400 to 800 IU qD with Meals. Avoids ETOH or ETOH abuse and control tightly the blood sugars and Diabetes to Prevent Neuropathy.                   Immunizations/Injections      [facility] SARS-CoV-211/3/2021, 10/5/2021  Pneumococcal polysaccharide vaccine, 23-valent, age 2 years and older (PNEUMOVAX 23)8/17/2016               Immunizations/Injections     [facility] SARS-CoV-211/3/2021, 10/5/2021  Pneumococcal polysaccharide vaccine, 23-valent, age 2 years and older (PNEUMOVAX 23)8/17/2016

## 2024-01-18 NOTE — ASSESSMENT & PLAN NOTE
CAD-coronary artery disease-patient has a history of myocardial infarction/stent placed in stenosed coronary arteries. Target LDL should be below 70 milligrams per deciliter. Reviewed EKG which shows no significant changes. Follows with his cardiologist/ Dr. Meadows. He needs to call us with any new symptoms of angina or shortness of breath. Last stress test was/last coronary catheterization was/. Need to address risk factors BioCore controlling cholesterol blood pressure and diabetes. Educate extensively diet. Patient needs to follow in rehabilitation/mild exercises daily.

## 2024-01-24 DIAGNOSIS — R06.02 SHORTNESS OF BREATH: ICD-10-CM

## 2024-01-25 RX ORDER — LEVALBUTEROL TARTRATE 45 UG/1
AEROSOL, METERED ORAL
Qty: 15 G | Refills: 4 | Status: SHIPPED | OUTPATIENT
Start: 2024-01-25 | End: 2024-02-28

## 2024-02-01 ENCOUNTER — OFFICE VISIT (OUTPATIENT)
Dept: PRIMARY CARE | Facility: CLINIC | Age: 59
End: 2024-02-01
Payer: COMMERCIAL

## 2024-02-01 VITALS
DIASTOLIC BLOOD PRESSURE: 70 MMHG | RESPIRATION RATE: 16 BRPM | WEIGHT: 243 LBS | HEIGHT: 72 IN | HEART RATE: 66 BPM | SYSTOLIC BLOOD PRESSURE: 128 MMHG | BODY MASS INDEX: 32.91 KG/M2

## 2024-02-01 DIAGNOSIS — E88.819 INSULIN RESISTANCE: ICD-10-CM

## 2024-02-01 DIAGNOSIS — I10 HYPERTENSION, UNSPECIFIED TYPE: ICD-10-CM

## 2024-02-01 DIAGNOSIS — M51.16 LUMBAR DISC DISEASE WITH RADICULOPATHY: ICD-10-CM

## 2024-02-01 DIAGNOSIS — J45.909 MODERATE ASTHMA, UNSPECIFIED WHETHER COMPLICATED, UNSPECIFIED WHETHER PERSISTENT (HHS-HCC): ICD-10-CM

## 2024-02-01 DIAGNOSIS — D69.6 THROMBOCYTOPENIA (CMS-HCC): ICD-10-CM

## 2024-02-01 DIAGNOSIS — I10 BENIGN ESSENTIAL HYPERTENSION: Primary | ICD-10-CM

## 2024-02-01 DIAGNOSIS — I25.10 CORONARY ARTERY DISEASE INVOLVING NATIVE CORONARY ARTERY OF NATIVE HEART, UNSPECIFIED WHETHER ANGINA PRESENT: ICD-10-CM

## 2024-02-01 DIAGNOSIS — E78.00 HYPERCHOLESTEROLEMIA: ICD-10-CM

## 2024-02-01 PROBLEM — Z98.890 HISTORY OF SPINAL SURGERY: Status: RESOLVED | Noted: 2024-02-01 | Resolved: 2024-02-01

## 2024-02-01 PROBLEM — M51.26 DISPLACEMENT OF LUMBAR INTERVERTEBRAL DISC WITHOUT MYELOPATHY: Status: ACTIVE | Noted: 2017-08-14

## 2024-02-01 PROBLEM — M79.89 SWELLING OF UPPER EXTREMITY: Status: RESOLVED | Noted: 2024-02-01 | Resolved: 2024-02-01

## 2024-02-01 PROCEDURE — 3074F SYST BP LT 130 MM HG: CPT | Performed by: INTERNAL MEDICINE

## 2024-02-01 PROCEDURE — 99213 OFFICE O/P EST LOW 20 MIN: CPT | Performed by: INTERNAL MEDICINE

## 2024-02-01 PROCEDURE — 3078F DIAST BP <80 MM HG: CPT | Performed by: INTERNAL MEDICINE

## 2024-02-01 PROCEDURE — 4004F PT TOBACCO SCREEN RCVD TLK: CPT | Performed by: INTERNAL MEDICINE

## 2024-02-01 PROCEDURE — 4010F ACE/ARB THERAPY RXD/TAKEN: CPT | Performed by: INTERNAL MEDICINE

## 2024-02-01 RX ORDER — CLOPIDOGREL BISULFATE 75 MG/1
75 TABLET ORAL DAILY
Qty: 30 TABLET | Refills: 2 | Status: SHIPPED | OUTPATIENT
Start: 2024-02-01 | End: 2024-04-15

## 2024-02-01 RX ORDER — CLOPIDOGREL BISULFATE 75 MG/1
75 TABLET ORAL
COMMUNITY
Start: 2024-01-27 | End: 2024-02-01 | Stop reason: SDUPTHER

## 2024-02-01 RX ORDER — ALBUTEROL SULFATE 90 UG/1
2 AEROSOL, METERED RESPIRATORY (INHALATION) EVERY 6 HOURS PRN
Qty: 18 G | Refills: 2 | Status: SHIPPED | OUTPATIENT
Start: 2024-02-01

## 2024-02-01 RX ORDER — CANDESARTAN 4 MG/1
4 TABLET ORAL DAILY
COMMUNITY
Start: 2024-01-26

## 2024-02-01 RX ORDER — AMLODIPINE BESYLATE 10 MG/1
10 TABLET ORAL DAILY
Qty: 90 TABLET | Refills: 0 | Status: SHIPPED | OUTPATIENT
Start: 2024-02-01 | End: 2024-05-06

## 2024-02-01 ASSESSMENT — ENCOUNTER SYMPTOMS
CARDIOVASCULAR NEGATIVE: 1
ENDOCRINE NEGATIVE: 1
EYES NEGATIVE: 1
ALLERGIC/IMMUNOLOGIC NEGATIVE: 1
RESPIRATORY NEGATIVE: 1
MUSCULOSKELETAL NEGATIVE: 1
NEUROLOGICAL NEGATIVE: 1
GASTROINTESTINAL NEGATIVE: 1
PSYCHIATRIC NEGATIVE: 1
HEMATOLOGIC/LYMPHATIC NEGATIVE: 1
CONSTITUTIONAL NEGATIVE: 1

## 2024-02-01 NOTE — PROGRESS NOTES
Subjective   Patient ID: Cb Clayton is a 58 y.o. male who presents for Follow-up (Follow up).    HPI     Review of Systems   Constitutional: Negative.    HENT: Negative.     Eyes: Negative.    Respiratory: Negative.     Cardiovascular: Negative.    Gastrointestinal: Negative.    Endocrine: Negative.    Musculoskeletal: Negative.    Skin: Negative.    Allergic/Immunologic: Negative.    Neurological: Negative.    Hematological: Negative.    Psychiatric/Behavioral: Negative.     All other systems reviewed and are negative.      Objective   Ht 1.829 m (6')   Wt 110 kg (243 lb)   BMI 32.96 kg/m²   Blood pressure 128/70, pulse 66, resp. rate 16, height 1.829 m (6'), weight 110 kg (243 lb).   Physical Exam  Vitals and nursing note reviewed.   Constitutional:       Appearance: Normal appearance.   HENT:      Head: Normocephalic and atraumatic.      Right Ear: Tympanic membrane, ear canal and external ear normal.      Left Ear: Tympanic membrane, ear canal and external ear normal. There is no impacted cerumen.      Nose: Nose normal.      Mouth/Throat:      Mouth: Mucous membranes are moist.      Pharynx: Oropharynx is clear.   Eyes:      Extraocular Movements: Extraocular movements intact.      Conjunctiva/sclera: Conjunctivae normal.      Pupils: Pupils are equal, round, and reactive to light.   Cardiovascular:      Rate and Rhythm: Normal rate and regular rhythm.      Pulses: Normal pulses.      Heart sounds: Normal heart sounds. No murmur heard.  Pulmonary:      Effort: Pulmonary effort is normal. No respiratory distress.      Breath sounds: Normal breath sounds. No stridor. No wheezing, rhonchi or rales.   Chest:      Chest wall: No tenderness.   Abdominal:      General: Abdomen is flat. Bowel sounds are normal. There is no distension.      Palpations: Abdomen is soft. There is no mass.      Tenderness: There is no abdominal tenderness. There is no right CVA tenderness, left CVA tenderness, guarding or rebound.       Hernia: No hernia is present.   Musculoskeletal:         General: Normal range of motion.      Cervical back: Normal range of motion and neck supple.   Skin:     General: Skin is warm.      Capillary Refill: Capillary refill takes less than 2 seconds.   Neurological:      General: No focal deficit present.      Mental Status: He is alert.      Cranial Nerves: No cranial nerve deficit.      Sensory: No sensory deficit.      Motor: No weakness.      Coordination: Coordination normal.      Gait: Gait normal.      Deep Tendon Reflexes: Reflexes normal.   Psychiatric:         Mood and Affect: Mood normal.         Behavior: Behavior normal. Behavior is cooperative.         Thought Content: Thought content normal.         Judgment: Judgment normal.         Assessment/Plan   Problem List Items Addressed This Visit             ICD-10-CM    Hypertension - Primary I10    Relevant Medications    amLODIPine (Norvasc) 10 mg tablet    CAD (coronary artery disease), native coronary artery I25.10     CAD-coronary artery disease- Status Post STENT in CX in December 2023 and in 2021 patient has a history of myocardial infarction/stent placed in stenosed coronary arteries. Target LDL should be below 70 milligrams per deciliter. Reviewed EKG which shows no significant changes. Follows with his cardiologist/ Dr. Meadows. He needs to call us with any new symptoms of angina or shortness of breath. Last stress test was/last coronary catheterization was/. Need to address risk factors BioCore controlling cholesterol blood pressure and diabetes. Educate extensively diet. Patient needs to follow in rehabilitation/mild exercises daily.          Relevant Medications    amLODIPine (Norvasc) 10 mg tablet    clopidogrel (Plavix) 75 mg tablet    Lumbar disc disease with radiculopathy M51.16    Hypercholesterolemia E78.00     Hypercholesterolemia - Monitor lipid profile and educate patient upon risks of high cholesterol and targets. Educate diet  and change in lifestyle and increase in exercises - Refill:  Atorvastatin   80 mg daily in light of his stent in Cx  and educate compliance with medication and diet.             Asthma J45.909    Relevant Medications    albuterol 90 mcg/actuation inhaler    Insulin resistance E88.819     Educate extensively low carbohydrate diet AND LOW FAT DIET AND increase in exercise activity  and weight loss program and monitor HbA1C and glucose levels and consider Metformin 500 mg BID with meals            Thrombocytopenia (CMS/HCC) D69.6     Monitor CBC and Asymptomatic  now          Relevant Medications    clopidogrel (Plavix) 75 mg tablet          Benign essential hypertension - Primary I10        HTN - hypertension well/controlled .Target BP < 130/80  achieved. Educate low salt diet and exercise with weight loss. Educate home self monitoring and diary keeping. Educate risks of elevate blood pressure and benefits of prompt treatment.  Refill            CAD (coronary artery disease), native coronary artery I25.10       CAD-coronary artery disease-patient has a history of myocardial infarction/stent placed in stenosed coronary arteries. Target LDL should be below 70 milligrams per deciliter. Reviewed EKG which shows no significant changes. Follows with his cardiologist/ Dr. Meadows. He needs to call us with any new symptoms of angina or shortness of breath. Last stress test was/last coronary catheterization was/. Need to address risk factors BioCore controlling cholesterol blood pressure and diabetes. Educate extensively diet. Patient needs to follow in rehabilitation/mild exercises daily.              Relevant Medications     aspirin 81 mg EC tablet     Lumbar disc disease with radiculopathy M51.16       DDD - Degenerative disc disease of the Lumbo-Sacral (LS)/Cervical (C)  spine. Educate exercises and referred patient to Physical Therapy (PT). Ordered X-Ray's of the LS/C spine. Consider MRI. Radiculopathy in the distribution  of L4-L5-S1/C3-C4-C5 nerve roots, needs NSAIDS (Naprosin 500mg BID vs. Arthrotec 75mg BID or Prednisone taper (Medrol dose pack), Flexeril 10 mg qHS, heat application, and pain control with Tylenol vs. Vicodin 5/325 mg TID.             Hypercholesterolemia E78.00       Hypercholesterolemia - Monitor lipid profile and educate patient upon risks of high cholesterol and targets. Educate diet and change in lifestyle and increase in exercises - Refill:  Atorvastatin   80 mg daily in light of his stent in Cx  and educate compliance with medication and diet.              Controlled type 2 diabetes mellitus without complication, without long-term current use of insulin (CMS/[facility]) E11.9       DM - NIDDM  . Reviewed with patient / Will check  HbA1c and fasting blood sugars. Educate home self monitoring and diary keeping(reviewed with patient home blood sugar levels /diary). Educate extensively low calorie diet and weight loss with exercise. Reviewed BS- diary and Rx. Regimen. Bunker renal protection with ARB/ACEI.               Educate compliance with diet and Rx. And educate risks and autcomes.                                                              Hypercholesterolemia E78.00          Hypercholesterolemia - Monitor lipid profile and educate patient upon risks of high cholesterol and targets. Educate diet and change in lifestyle and increase in exercises - Refill:  Atorvastatin   80 mg daily in light of his stent in Cx  and educate compliance with medication and diet.             Controlled type 2 diabetes mellitus without complication, without long-term current use of insulin (CMS/[facility]) E11.9        DM - NIDDM  . Reviewed with patient / Will check  HbA1c and fasting blood sugars. Educate home self monitoring and diary keeping(reviewed with patient home blood sugar levels /diary). Educate extensively low calorie diet and weight loss with exercise. Reviewed BS- diary and Rx. Regimen. Bunker renal protection with  ARB/ACEI.               Educate compliance with diet and Rx. And educate risks and autcomes.                                                   Myocardial infarction (CMS/[facility]) I21.9        Status Post MI 10 days ago  had chest pains has a history of CAD and had a Stent placed in the 100% blocked coronary Circumflex artery follows with cardiology and start the Atorvastatin   80 mg daily and address risk factors and educate the Patient  - educate tobacco cessation and control blood pressure and cholesterol            Relevant Medications      carvedilol (Coreg) 12.5 mg tablet      nitroglycerin (Nitrostat) 0.4 mg SL tablet      sildenafil (Viagra) 100 mg tablet      ticagrelor (Brilinta) 90 mg tablet      Hospitalization within last 30 days - Primary Z92.89        reviewed the hospital course and reconcile her medications  for CAD and Status Post MI  =- Status Post MI 10 days ago  had chest pains has a history of CAD and had a Stent placed in the 100% blocked coronary Circumflex artery                            Benign essential hypertension I10           HTN - hypertension well/controlled .Target BP < 130/80  achieved. Educate low salt diet and exercise with weight loss. Educate home self monitoring and diary keeping. Educate risks of elevate blood pressure and benefits of prompt treatment.  Refill              Degenerative disc disease, cervical - Primary M50.30         DDD - Degenerative disc disease of the )/Cervical (C)  spine. Educate exercises and referred patient to Physical Therapy (PT). Ordered X-Ray's of the /C spine. Consider MRI. Radiculopathy in the distribution of C4-C5-C6 nerve roots, needs NSAIDS (Naprosin 500mg BID vs. Arthrotec 75mg BID or Prednisone taper (Medrol dose pack), Flexeril 10 mg qHS, heat application, and pain control with Tylenol 325 mg TID.               Relevant Orders       Referral to Physical Therapy       Rotator cuff tear M75.100         Rotator Cuff  Syndrome - Left  Shoulder.  Tender abduction/adduction and internal/external rotation of the R/L shoulder. Recommend MRI of the affected shoulder and Physical Therapy. Consider Orthopedic consult if no improvement. Also Recommend limitation of all activities targeting the overuse of the rotator cuff.              Relevant Orders       Referral to Physical Therapy       Annual physical exam Z00.00         Overall stable and monitor blood pressure and cervical disc disease and left shoulder pain due to trauma at work      Preventive measures - Recommend ASAP : Last  Colonoscopy 2 years (educate patient risks of colon cancer) refer patient to GI specialist. Ophthalmology and retina exam recommend yearly exams refer patient to an Ophthalmologist. BPH - (Benign Prostatic Hypertrophy) refer patient to an Urologist for rectal exam and PSA check.      Refuses vaccinations of all kind           Relevant Orders       Comprehensive Metabolic Panel       CBC and Auto Differential       Acquired hypothyroidism E03.9       Relevant Orders       TSH with reflex to Free T4 if abnormal       Benign prostatic hyperplasia without lower urinary tract symptoms N40.0       Relevant Orders       Prostate Specific Antigen        Other Visit Diagnoses           Codes     Shortness of breath     R06.02     Relevant Medications     albuterol 90 mcg/actuation inhaler     predniSONE (Deltasone) 20 mg tablet     Hypertension, unspecified type     I10     Relevant Medications     allopurinol (Zyloprim) 300 mg tablet     amLODIPine (Norvasc) 10 mg tablet     hydroCHLOROthiazide (HYDRODiuril) 25 mg tablet     Acute gout of foot, unspecified cause, unspecified laterality     M10.9     Relevant Medications     colchicine, gout, 0.6 mg tablet     Cholesterol depletion     E78.6     Relevant Medications     ezetimibe (Zetia) 10 mg tablet     Other Relevant Orders     Lipid Panel                HTN - hypertension well/controlled.Target BP < 130/80 well/not achieved. Educate low  salt diet and exercise with weight loss. Educate home self monitoring and diary keeping. Educate risks of elevate blood pressure and benefits of prompt treatment. Refill:      Hypercholesterolemia - Monitor lipid profile and educate patient upon risks of high cholesterol and targets. Educate diet and change in lifestyle and increase in exercises - Refill: and educate compliance with medication and diet.      Erection Disorder - Erection Disorder - Viagra/Sildenafil 100 mg qD PRN vs. Cialis 20 mg PRN vs. Levitra 20 mg PRN. Educate Exercises (walking and Jogging to Improve Pelvic Blood flow and decrease the size of the Prostate ). Also Recommend Vitamin E 400 to 800 IU qD with Meals. Avoids ETOH or ETOH abuse and control tightly the blood sugars and Diabetes to Prevent Neuropathy.      DDD - Degenerative disc disease of the )/Cervical (C) spine. Educate exercises and referred patient to Physical Therapy (PT). Ordered X-Ray's of the /C spine. Consider MRI. Radiculopathy in the distribution of C4-C5-C6 nerve roots, needs NSAIDS (Naprosin 500mg BID vs. Arthrotec 75mg BID or Prednisone taper (Medrol dose pack), Flexeril 10 mg qHS, heat application, and pain control with Tylenol vs. Vicodin 5/325 mg TID.      CAD-coronary artery disease-patient has a history of Coronary artery disease -. Target LDL should be below 70 milligrams per deciliter. Reviewed angiogram and status post stent. Follows with his cardiologist and needs a follow up heart catheterization. He needs to call us with any new symptoms of angina or shortness of breath. Last coronary catheterization/Calcium scoring was/ in. Need to address risk factors: cholesterol, blood pressure and diabetes. Educate extensively diet. Patient needs to follow in rehabilitation/mild exercises daily.     DDD - Degenerative disc disease of the Lumbo-Sacral (LS)/ spine. Educate exercises and referred patient to Physical Therapy (PT). Ordered X-Ray's of the LS/ spine. Consider MRI.       Radiculopathy in the distribution of L4-L5-S1/ nerve roots, needs NSAIDS (Naprosin 500mg BID vs. Arthrotec 75mg BID or Prednisone taper (Medrol dose pack), Flexeril 10 mg qHS, heat application, and pain control with Tylenol and Naprosyn 500 mg BID. Educate exercises and referred the patient to PT (Physical Therapy). Get X-Ray's.      Right wrsit swelling - due to arthritis vs. strain vs. gout vs. tendonitis - recommend: heat application and Voltaren gel topically and Reviewed with the patient the the X-rays and run a arthritis panel      DDD - Degenerative disc disease of the Lumbo-Sacral (LS)/ spine. Educate exercises and referred patient to Physical Therapy (PT). Ordered X-Ray's of the LS/ spine. Consider MRI.      Radiculopathy in the distribution of L4-L5-S1/ nerve roots, needs NSAIDS (Naprosin 500mg BID vs. Arthrotec 75mg BID or Prednisone taper (Medrol dose pack), Flexeril 10 mg qHS, heat application, and pain control with Tylenol and Naprosyn 500 mg BID. Educate exercises and referred the patient to PT (Physical Therapy). Get X-Ray's.      Chronic gout into the right first MCP joint - with pain and swelling and redness - educate extensively diet and start Allopurinol 300 m TID _ Colchicine 0.6 mg daily and Allopurinol 300 mg daily -= and pain control with Tylenol -      Left shoulder pain and rotator cuff syndrome possibly associated with Degenerative Joint Disease vs. Chronic Arthritis, of the right shoulder. Pain control, and antiinflammatory medications : consider Kenalog injection and start Voltaren gel 1% topically BID, and Tylenol 325 mg TID PRN. Educate exercises and referred the patient to PT (Physical Therapy). Get X-Ray's. improving symptoms and Educate extensively exercises and stretches and consider MRI of the right shoulder before Kenalog injection      Right elbow contusion - and pain -= recommend: X-rays and bracing - still has some discomfort      Erection Disorder - Viagra 100 mg qD PRN  vs. Cialis 20 mg PRN vs. Levitra 20 mg PRN. Educate Exercises (walking and Jogging to Improve Pelvic Blood flow and decrease the size of the Prostate ). Also Recommend Vitamin E 400 to 800 IU qD with Meals. Avoids ETOH or ETOH abuse and control tightly the blood sugars and Diabetes to Prevent Neuropathy.                   Immunizations/Injections      Pfizer Purple Cap SARS-CoV-211/3/2021, 10/5/2021  Pneumococcal polysaccharide vaccine, 23-valent, age 2 years and older (PNEUMOVAX 23)8/17/2016                Immunizations/Injections      Pfizer Purple Cap SARS-CoV-211/3/2021, 10/5/2021  Pneumococcal polysaccharide vaccine, 23-valent, age 2 years and older (PNEUMOVAX 23)8/17/2016               Immunizations/Injections     Pfizer Purple Cap SARS-CoV-211/3/2021, 10/5/2021  Pneumococcal polysaccharide vaccine, 23-valent, age 2 years and older (PNEUMOVAX 23)8/17/2016

## 2024-02-01 NOTE — ASSESSMENT & PLAN NOTE
CAD-coronary artery disease- Status Post STENT in CX in December 2023 and in 2021 patient has a history of myocardial infarction/stent placed in stenosed coronary arteries. Target LDL should be below 70 milligrams per deciliter. Reviewed EKG which shows no significant changes. Follows with his cardiologist/ Dr. Meadows. He needs to call us with any new symptoms of angina or shortness of breath. Last stress test was/last coronary catheterization was/. Need to address risk factors BioCore controlling cholesterol blood pressure and diabetes. Educate extensively diet. Patient needs to follow in rehabilitation/mild exercises daily.

## 2024-02-06 ENCOUNTER — APPOINTMENT (OUTPATIENT)
Dept: CARDIOLOGY | Facility: CLINIC | Age: 59
End: 2024-02-06
Payer: COMMERCIAL

## 2024-02-15 ENCOUNTER — APPOINTMENT (OUTPATIENT)
Dept: PRIMARY CARE | Facility: CLINIC | Age: 59
End: 2024-02-15
Payer: COMMERCIAL

## 2024-02-15 DIAGNOSIS — E78.6 CHOLESTEROL DEPLETION: ICD-10-CM

## 2024-02-15 DIAGNOSIS — I25.118 CORONARY ARTERY DISEASE OF NATIVE ARTERY OF NATIVE HEART WITH STABLE ANGINA PECTORIS (CMS-HCC): ICD-10-CM

## 2024-02-15 DIAGNOSIS — I10 HYPERTENSION, UNSPECIFIED TYPE: ICD-10-CM

## 2024-02-15 RX ORDER — ASPIRIN 81 MG/1
81 TABLET ORAL DAILY
Qty: 90 TABLET | Refills: 0 | Status: SHIPPED | OUTPATIENT
Start: 2024-02-15 | End: 2024-02-28

## 2024-02-15 RX ORDER — HYDROCHLOROTHIAZIDE 25 MG/1
25 TABLET ORAL DAILY
Qty: 90 TABLET | Refills: 0 | Status: SHIPPED | OUTPATIENT
Start: 2024-02-15

## 2024-02-15 RX ORDER — EZETIMIBE 10 MG/1
10 TABLET ORAL DAILY
Qty: 90 TABLET | Refills: 0 | Status: SHIPPED | OUTPATIENT
Start: 2024-02-15 | End: 2024-05-06

## 2024-02-28 DIAGNOSIS — R06.02 SHORTNESS OF BREATH: ICD-10-CM

## 2024-02-28 DIAGNOSIS — I25.118 CORONARY ARTERY DISEASE OF NATIVE ARTERY OF NATIVE HEART WITH STABLE ANGINA PECTORIS (CMS-HCC): ICD-10-CM

## 2024-02-28 RX ORDER — ASPIRIN 81 MG/1
81 TABLET ORAL DAILY
Qty: 90 TABLET | Refills: 0 | Status: SHIPPED | OUTPATIENT
Start: 2024-02-28 | End: 2024-05-06

## 2024-02-28 RX ORDER — LEVALBUTEROL TARTRATE 45 UG/1
AEROSOL, METERED ORAL
Qty: 15 G | Refills: 2 | Status: SHIPPED | OUTPATIENT
Start: 2024-02-28

## 2024-02-29 RX ORDER — PREDNISONE 20 MG/1
20 TABLET ORAL DAILY
Qty: 90 TABLET | Refills: 0 | Status: SHIPPED | OUTPATIENT
Start: 2024-02-29 | End: 2024-05-28

## 2024-03-14 ENCOUNTER — TELEPHONE (OUTPATIENT)
Dept: PRIMARY CARE | Facility: CLINIC | Age: 59
End: 2024-03-14
Payer: COMMERCIAL

## 2024-03-21 ENCOUNTER — OFFICE VISIT (OUTPATIENT)
Dept: PRIMARY CARE | Facility: CLINIC | Age: 59
End: 2024-03-21
Payer: COMMERCIAL

## 2024-03-21 VITALS
HEART RATE: 72 BPM | SYSTOLIC BLOOD PRESSURE: 120 MMHG | WEIGHT: 238 LBS | RESPIRATION RATE: 16 BRPM | HEIGHT: 72 IN | DIASTOLIC BLOOD PRESSURE: 80 MMHG | BODY MASS INDEX: 32.23 KG/M2

## 2024-03-21 DIAGNOSIS — E11.9 CONTROLLED TYPE 2 DIABETES MELLITUS WITHOUT COMPLICATION, WITHOUT LONG-TERM CURRENT USE OF INSULIN (MULTI): ICD-10-CM

## 2024-03-21 DIAGNOSIS — M51.16 LUMBAR DISC DISEASE WITH RADICULOPATHY: ICD-10-CM

## 2024-03-21 DIAGNOSIS — E78.00 HYPERCHOLESTEROLEMIA: ICD-10-CM

## 2024-03-21 DIAGNOSIS — I21.A1 TYPE 2 MYOCARDIAL INFARCTION (MULTI): ICD-10-CM

## 2024-03-21 DIAGNOSIS — E03.9 ACQUIRED HYPOTHYROIDISM: ICD-10-CM

## 2024-03-21 DIAGNOSIS — I10 PRIMARY HYPERTENSION: ICD-10-CM

## 2024-03-21 DIAGNOSIS — I25.118 CORONARY ARTERY DISEASE OF NATIVE ARTERY OF NATIVE HEART WITH STABLE ANGINA PECTORIS (CMS-HCC): Primary | ICD-10-CM

## 2024-03-21 PROCEDURE — 99214 OFFICE O/P EST MOD 30 MIN: CPT | Performed by: INTERNAL MEDICINE

## 2024-03-21 PROCEDURE — 4010F ACE/ARB THERAPY RXD/TAKEN: CPT | Performed by: INTERNAL MEDICINE

## 2024-03-21 PROCEDURE — 3079F DIAST BP 80-89 MM HG: CPT | Performed by: INTERNAL MEDICINE

## 2024-03-21 PROCEDURE — 3074F SYST BP LT 130 MM HG: CPT | Performed by: INTERNAL MEDICINE

## 2024-03-21 PROCEDURE — 4004F PT TOBACCO SCREEN RCVD TLK: CPT | Performed by: INTERNAL MEDICINE

## 2024-03-21 ASSESSMENT — ENCOUNTER SYMPTOMS
HEMATOLOGIC/LYMPHATIC NEGATIVE: 1
ENDOCRINE NEGATIVE: 1
EYES NEGATIVE: 1
CONSTITUTIONAL NEGATIVE: 1
RESPIRATORY NEGATIVE: 1
PSYCHIATRIC NEGATIVE: 1
CARDIOVASCULAR NEGATIVE: 1
MUSCULOSKELETAL NEGATIVE: 1
GASTROINTESTINAL NEGATIVE: 1
NEUROLOGICAL NEGATIVE: 1
ALLERGIC/IMMUNOLOGIC NEGATIVE: 1

## 2024-03-21 NOTE — PROGRESS NOTES
Subjective   Patient ID: Cb Clayton is a 58 y.o. male who presents for Follow-up ([facility] paperwork for back ).    HPI     Review of Systems   Constitutional: Negative.    HENT: Negative.     Eyes: Negative.    Respiratory: Negative.     Cardiovascular: Negative.    Gastrointestinal: Negative.    Endocrine: Negative.    Musculoskeletal: Negative.    Skin: Negative.    Allergic/Immunologic: Negative.    Neurological: Negative.    Hematological: Negative.    Psychiatric/Behavioral: Negative.     All other systems reviewed and are negative.      Objective   Ht 1.829 m (6')   Wt 108 kg (238 lb)   BMI 32.28 kg/m²   Blood pressure 120/80, pulse 72, resp. rate 16, height 1.829 m (6'), weight 108 kg (238 lb).   Physical Exam  Vitals and nursing note reviewed.   Constitutional:       Appearance: Normal appearance.   HENT:      Head: Normocephalic and atraumatic.      Right Ear: Tympanic membrane, ear canal and external ear normal.      Left Ear: Tympanic membrane, ear canal and external ear normal. There is no impacted cerumen.      Nose: Nose normal.      Mouth/Throat:      Mouth: Mucous membranes are moist.      Pharynx: Oropharynx is clear.   Eyes:      Extraocular Movements: Extraocular movements intact.      Conjunctiva/sclera: Conjunctivae normal.      Pupils: Pupils are equal, round, and reactive to light.   Cardiovascular:      Rate and Rhythm: Normal rate and regular rhythm.      Pulses: Normal pulses.      Heart sounds: Normal heart sounds. No murmur heard.  Pulmonary:      Effort: Pulmonary effort is normal. No respiratory distress.      Breath sounds: Normal breath sounds. No stridor. No wheezing, rhonchi or rales.   Chest:      Chest wall: No tenderness.   Abdominal:      General: Abdomen is flat. Bowel sounds are normal. There is no distension.      Palpations: Abdomen is soft. There is no mass.      Tenderness: There is no abdominal tenderness. There is no right CVA tenderness, left CVA tenderness,  guarding or rebound.      Hernia: No hernia is present.   Musculoskeletal:         General: Normal range of motion.      Cervical back: Normal range of motion and neck supple.   Skin:     General: Skin is warm.      Capillary Refill: Capillary refill takes less than 2 seconds.   Neurological:      General: No focal deficit present.      Mental Status: He is alert.      Cranial Nerves: No cranial nerve deficit.      Sensory: No sensory deficit.      Motor: No weakness.      Coordination: Coordination normal.      Gait: Gait normal.      Deep Tendon Reflexes: Reflexes normal.   Psychiatric:         Mood and Affect: Mood normal.         Behavior: Behavior normal. Behavior is cooperative.         Thought Content: Thought content normal.         Judgment: Judgment normal.         Assessment/Plan   Problem List Items Addressed This Visit             ICD-10-CM    Hypertension I10    CAD (coronary artery disease), native coronary artery - Primary I25.10    Lumbar disc disease with radiculopathy M51.16    Hypercholesterolemia E78.00    Acquired hypothyroidism E03.9    Controlled type 2 diabetes mellitus without complication, without long-term current use of insulin (CMS/[facility]) E11.9    Myocardial infarction (CMS/[facility]) I21.9       Hypertension - Primary I10      Relevant Medications     amLODIPine (Norvasc) 10 mg tablet     CAD (coronary artery disease), native coronary artery I25.10       CAD-coronary artery disease- Status Post STENT in CX in December 2023 and in 2021 patient has a history of myocardial infarction/stent placed in stenosed coronary arteries. Target LDL should be below 70 milligrams per deciliter. Reviewed EKG which shows no significant changes. Follows with his cardiologist/ Dr. Meadows. He needs to call us with any new symptoms of angina or shortness of breath. Last stress test was/last coronary catheterization was/. Need to address risk factors BioCore controlling cholesterol blood pressure and diabetes.  Educate extensively diet. Patient needs to follow in rehabilitation/mild exercises daily.            Relevant Medications     amLODIPine (Norvasc) 10 mg tablet     clopidogrel (Plavix) 75 mg tablet     Lumbar disc disease with radiculopathy M51.16     Hypercholesterolemia E78.00       Hypercholesterolemia - Monitor lipid profile and educate patient upon risks of high cholesterol and targets. Educate diet and change in lifestyle and increase in exercises - Refill:  Atorvastatin   80 mg daily in light of his stent in Cx  and educate compliance with medication and diet.               Asthma J45.909     Relevant Medications     albuterol 90 mcg/actuation inhaler     Insulin resistance E88.819       Educate extensively low carbohydrate diet AND LOW FAT DIET AND increase in exercise activity  and weight loss program and monitor HbA1C and glucose levels and consider Metformin 500 mg BID with meals              Thrombocytopenia (CMS/HCC) D69.6       Monitor CBC and Asymptomatic  now            Relevant Medications     clopidogrel (Plavix) 75 mg tablet                Benign essential hypertension - Primary I10          HTN - hypertension well/controlled .Target BP < 130/80  achieved. Educate low salt diet and exercise with weight loss. Educate home self monitoring and diary keeping. Educate risks of elevate blood pressure and benefits of prompt treatment.  Refill            CAD (coronary artery disease), native coronary artery I25.10        CAD-coronary artery disease-patient has a history of myocardial infarction/stent placed in stenosed coronary arteries. Target LDL should be below 70 milligrams per deciliter. Reviewed EKG which shows no significant changes. Follows with his cardiologist/ Dr. Meadows. He needs to call us with any new symptoms of angina or shortness of breath. Last stress test was/last coronary catheterization was/. Need to address risk factors BioCore controlling cholesterol blood pressure and diabetes.  Educate extensively diet. Patient needs to follow in rehabilitation/mild exercises daily.              Relevant Medications      aspirin 81 mg EC tablet      Lumbar disc disease with radiculopathy M51.16        DDD - Degenerative disc disease of the Lumbo-Sacral (LS)/Cervical (C)  spine. Educate exercises and referred patient to Physical Therapy (PT). Ordered X-Ray's of the LS/C spine. Consider MRI. Radiculopathy in the distribution of L4-L5-S1/C3-C4-C5 nerve roots, needs NSAIDS (Naprosin 500mg BID vs. Arthrotec 75mg BID or Prednisone taper (Medrol dose pack), Flexeril 10 mg qHS, heat application, and pain control with Tylenol vs. Vicodin 5/325 mg TID.             Hypercholesterolemia E78.00        Hypercholesterolemia - Monitor lipid profile and educate patient upon risks of high cholesterol and targets. Educate diet and change in lifestyle and increase in exercises - Refill:  Atorvastatin   80 mg daily in light of his stent in Cx  and educate compliance with medication and diet.              Controlled type 2 diabetes mellitus without complication, without long-term current use of insulin (CMS/[facility]) E11.9        DM - NIDDM  . Reviewed with patient / Will check  HbA1c and fasting blood sugars. Educate home self monitoring and diary keeping(reviewed with patient home blood sugar levels /diary). Educate extensively low calorie diet and weight loss with exercise. Reviewed BS- diary and Rx. Regimen. [place] renal protection with ARB/ACEI.               Educate compliance with diet and Rx. And educate risks and autcomes.                                                                   Hypercholesterolemia E78.00           Hypercholesterolemia - Monitor lipid profile and educate patient upon risks of high cholesterol and targets. Educate diet and change in lifestyle and increase in exercises - Refill:  Atorvastatin   80 mg daily in light of his stent in Cx  and educate compliance with medication and diet.               Controlled type 2 diabetes mellitus without complication, without long-term current use of insulin (CMS/[facility]) E11.9         DM - NIDDM  . Reviewed with patient / Will check  HbA1c and fasting blood sugars. Educate home self monitoring and diary keeping(reviewed with patient home blood sugar levels /diary). Educate extensively low calorie diet and weight loss with exercise. Reviewed BS- diary and Rx. Regimen. [place] renal protection with ARB/ACEI.               Educate compliance with diet and Rx. And educate risks and autcomes.                                                    Myocardial infarction (CMS/[facility]) I21.9         Status Post MI 10 days ago  had chest pains has a history of CAD and had a Stent placed in the 100% blocked coronary Circumflex artery follows with cardiology and start the Atorvastatin   80 mg daily and address risk factors and educate the Patient  - educate tobacco cessation and control blood pressure and cholesterol             Relevant Medications       carvedilol (Coreg) 12.5 mg tablet       nitroglycerin (Nitrostat) 0.4 mg SL tablet       sildenafil (Viagra) 100 mg tablet       ticagrelor (Brilinta) 90 mg tablet       Hospitalization within last 30 days - Primary Z92.89         reviewed the hospital course and reconcile her medications  for CAD and Status Post MI  =- Status Post MI 10 days ago  had chest pains has a history of CAD and had a Stent placed in the 100% blocked coronary Circumflex artery                             Benign essential hypertension I10           HTN - hypertension well/controlled .Target BP < 130/80  achieved. Educate low salt diet and exercise with weight loss. Educate home self monitoring and diary keeping. Educate risks of elevate blood pressure and benefits of prompt treatment.  Refill              Degenerative disc disease, cervical - Primary M50.30         DDD - Degenerative disc disease of the )/Cervical (C)  spine. Educate exercises and referred patient to  Physical Therapy (PT). Ordered X-Ray's of the /C spine. Consider MRI. Radiculopathy in the distribution of C4-C5-C6 nerve roots, needs NSAIDS (Naprosin 500mg BID vs. Arthrotec 75mg BID or Prednisone taper (Medrol dose pack), Flexeril 10 mg qHS, heat application, and pain control with Tylenol 325 mg TID.               Relevant Orders       Referral to Physical Therapy       Rotator cuff tear M75.100         Rotator Cuff  Syndrome - Left  Shoulder. Tender abduction/adduction and internal/external rotation of the R/L shoulder. Recommend MRI of the affected shoulder and Physical Therapy. Consider Orthopedic consult if no improvement. Also Recommend limitation of all activities targeting the overuse of the rotator cuff.              Relevant Orders       Referral to Physical Therapy       Annual physical exam Z00.00         Overall stable and monitor blood pressure and cervical disc disease and left shoulder pain due to trauma at work      Preventive measures - Recommend ASAP : Last  Colonoscopy 2 years (educate patient risks of colon cancer) refer patient to GI specialist. Ophthalmology and retina exam recommend yearly exams refer patient to an Ophthalmologist. BPH - (Benign Prostatic Hypertrophy) refer patient to an Urologist for rectal exam and PSA check.      Refuses vaccinations of all kind           Relevant Orders       Comprehensive Metabolic Panel       CBC and Auto Differential       Acquired hypothyroidism E03.9       Relevant Orders       TSH with reflex to Free T4 if abnormal       Benign prostatic hyperplasia without lower urinary tract symptoms N40.0       Relevant Orders       Prostate Specific Antigen        Other Visit Diagnoses           Codes     Shortness of breath     R06.02     Relevant Medications     albuterol 90 mcg/actuation inhaler     predniSONE (Deltasone) 20 mg tablet     Hypertension, unspecified type     I10     Relevant Medications     allopurinol (Zyloprim) 300 mg tablet     amLODIPine  (Norvasc) 10 mg tablet     hydroCHLOROthiazide (HYDRODiuril) 25 mg tablet     Acute gout of foot, unspecified cause, unspecified laterality     M10.9     Relevant Medications     colchicine, gout, 0.6 mg tablet     Cholesterol depletion     E78.6     Relevant Medications     ezetimibe (Zetia) 10 mg tablet     Other Relevant Orders     Lipid Panel                HTN - hypertension well/controlled.Target BP < 130/80 well/not achieved. Educate low salt diet and exercise with weight loss. Educate home self monitoring and diary keeping. Educate risks of elevate blood pressure and benefits of prompt treatment. Refill:      Hypercholesterolemia - Monitor lipid profile and educate patient upon risks of high cholesterol and targets. Educate diet and change in lifestyle and increase in exercises - Refill: and educate compliance with medication and diet.      Erection Disorder - Erection Disorder - Viagra/Sildenafil 100 mg qD PRN vs. Cialis 20 mg PRN vs. Levitra 20 mg PRN. Educate Exercises (walking and Jogging to Improve Pelvic Blood flow and decrease the size of the Prostate ). Also Recommend Vitamin E 400 to 800 IU qD with Meals. Avoids ETOH or ETOH abuse and control tightly the blood sugars and Diabetes to Prevent Neuropathy.      DDD - Degenerative disc disease of the )/Cervical (C) spine. Educate exercises and referred patient to Physical Therapy (PT). Ordered X-Ray's of the /C spine. Consider MRI. Radiculopathy in the distribution of C4-C5-C6 nerve roots, needs NSAIDS (Naprosin 500mg BID vs. Arthrotec 75mg BID or Prednisone taper (Medrol dose pack), Flexeril 10 mg qHS, heat application, and pain control with Tylenol vs. Vicodin 5/325 mg TID.      CAD-coronary artery disease-patient has a history of Coronary artery disease -. Target LDL should be below 70 milligrams per deciliter. Reviewed angiogram and status post stent. Follows with his cardiologist and needs a follow up heart catheterization. He needs to call us with  any new symptoms of angina or shortness of breath. Last coronary catheterization/Calcium scoring was/ in. Need to address risk factors: cholesterol, blood pressure and diabetes. Educate extensively diet. Patient needs to follow in rehabilitation/mild exercises daily.     DDD - Degenerative disc disease of the Lumbo-Sacral (LS)/ spine. Educate exercises and referred patient to Physical Therapy (PT). Ordered X-Ray's of the LS/ spine. Consider MRI.      Radiculopathy in the distribution of L4-L5-S1/ nerve roots, needs NSAIDS (Naprosin 500mg BID vs. Arthrotec 75mg BID or Prednisone taper (Medrol dose pack), Flexeril 10 mg qHS, heat application, and pain control with Tylenol and Naprosyn 500 mg BID. Educate exercises and referred the patient to PT (Physical Therapy). Get X-Ray's.      Right wrsit swelling - due to arthritis vs. strain vs. gout vs. tendonitis - recommend: heat application and Voltaren gel topically and Reviewed with the patient the the X-rays and run a arthritis panel      DDD - Degenerative disc disease of the Lumbo-Sacral (LS)/ spine. Educate exercises and referred patient to Physical Therapy (PT). Ordered X-Ray's of the LS/ spine. Consider MRI.      Radiculopathy in the distribution of L4-L5-S1/ nerve roots, needs NSAIDS (Naprosin 500mg BID vs. Arthrotec 75mg BID or Prednisone taper (Medrol dose pack), Flexeril 10 mg qHS, heat application, and pain control with Tylenol and Naprosyn 500 mg BID. Educate exercises and referred the patient to PT (Physical Therapy). Get X-Ray's.      Chronic gout into the right first MCP joint - with pain and swelling and redness - educate extensively diet and start Allopurinol 300 m TID _ Colchicine 0.6 mg daily and Allopurinol 300 mg daily -= and pain control with Tylenol -      Left shoulder pain and rotator cuff syndrome possibly associated with Degenerative Joint Disease vs. Chronic Arthritis, of the right shoulder. Pain control, and antiinflammatory medications :  consider Kenalog injection and start Voltaren gel 1% topically BID, and Tylenol 325 mg TID PRN. Educate exercises and referred the patient to PT (Physical Therapy). Get X-Ray's. improving symptoms and Educate extensively exercises and stretches and consider MRI of the right shoulder before Kenalog injection      Right elbow contusion - and pain -= recommend: X-rays and bracing - still has some discomfort      Erection Disorder - Viagra 100 mg qD PRN vs. Cialis 20 mg PRN vs. Levitra 20 mg PRN. Educate Exercises (walking and Jogging to Improve Pelvic Blood flow and decrease the size of the Prostate ). Also Recommend Vitamin E 400 to 800 IU qD with Meals. Avoids ETOH or ETOH abuse and control tightly the blood sugars and Diabetes to Prevent Neuropathy.

## 2024-04-13 DIAGNOSIS — I25.10 CORONARY ARTERY DISEASE INVOLVING NATIVE CORONARY ARTERY OF NATIVE HEART, UNSPECIFIED WHETHER ANGINA PRESENT: ICD-10-CM

## 2024-04-15 RX ORDER — CLOPIDOGREL BISULFATE 75 MG/1
75 TABLET ORAL DAILY
Qty: 90 TABLET | Refills: 1 | Status: SHIPPED | OUTPATIENT
Start: 2024-04-15

## 2024-05-06 DIAGNOSIS — I25.118 CORONARY ARTERY DISEASE OF NATIVE ARTERY OF NATIVE HEART WITH STABLE ANGINA PECTORIS (CMS-HCC): ICD-10-CM

## 2024-05-06 DIAGNOSIS — I10 HYPERTENSION, UNSPECIFIED TYPE: ICD-10-CM

## 2024-05-06 DIAGNOSIS — E78.6 CHOLESTEROL DEPLETION: ICD-10-CM

## 2024-05-06 RX ORDER — AMLODIPINE BESYLATE 10 MG/1
10 TABLET ORAL DAILY
Qty: 90 TABLET | Refills: 0 | Status: SHIPPED | OUTPATIENT
Start: 2024-05-06

## 2024-05-06 RX ORDER — ASPIRIN 81 MG/1
81 TABLET ORAL DAILY
Qty: 90 TABLET | Refills: 0 | Status: SHIPPED | OUTPATIENT
Start: 2024-05-06

## 2024-05-06 RX ORDER — EZETIMIBE 10 MG/1
10 TABLET ORAL DAILY
Qty: 90 TABLET | Refills: 0 | Status: SHIPPED | OUTPATIENT
Start: 2024-05-06

## 2024-05-07 ENCOUNTER — APPOINTMENT (OUTPATIENT)
Dept: PRIMARY CARE | Facility: CLINIC | Age: 59
End: 2024-05-07
Payer: COMMERCIAL

## 2024-05-27 DIAGNOSIS — R06.02 SHORTNESS OF BREATH: ICD-10-CM

## 2024-05-28 RX ORDER — PREDNISONE 20 MG/1
20 TABLET ORAL DAILY
Qty: 30 TABLET | Refills: 0 | Status: SHIPPED | OUTPATIENT
Start: 2024-05-28 | End: 2024-06-11 | Stop reason: ALTCHOICE

## 2024-06-11 ENCOUNTER — HOSPITAL ENCOUNTER (OUTPATIENT)
Dept: RADIOLOGY | Facility: CLINIC | Age: 59
Discharge: HOME | End: 2024-06-11
Payer: COMMERCIAL

## 2024-06-11 ENCOUNTER — OFFICE VISIT (OUTPATIENT)
Dept: PRIMARY CARE | Facility: CLINIC | Age: 59
End: 2024-06-11
Payer: COMMERCIAL

## 2024-06-11 VITALS
DIASTOLIC BLOOD PRESSURE: 70 MMHG | HEART RATE: 64 BPM | BODY MASS INDEX: 32.23 KG/M2 | WEIGHT: 238 LBS | RESPIRATION RATE: 18 BRPM | OXYGEN SATURATION: 97 % | HEIGHT: 72 IN | SYSTOLIC BLOOD PRESSURE: 130 MMHG

## 2024-06-11 DIAGNOSIS — E11.9 CONTROLLED TYPE 2 DIABETES MELLITUS WITHOUT COMPLICATION, WITHOUT LONG-TERM CURRENT USE OF INSULIN (MULTI): ICD-10-CM

## 2024-06-11 DIAGNOSIS — J98.01 COUGH DUE TO BRONCHOSPASM: ICD-10-CM

## 2024-06-11 DIAGNOSIS — I10 PRIMARY HYPERTENSION: ICD-10-CM

## 2024-06-11 DIAGNOSIS — J45.41 MODERATE PERSISTENT ASTHMA WITH ACUTE EXACERBATION (HHS-HCC): Primary | ICD-10-CM

## 2024-06-11 PROCEDURE — 99213 OFFICE O/P EST LOW 20 MIN: CPT | Performed by: INTERNAL MEDICINE

## 2024-06-11 PROCEDURE — 3078F DIAST BP <80 MM HG: CPT | Performed by: INTERNAL MEDICINE

## 2024-06-11 PROCEDURE — 4010F ACE/ARB THERAPY RXD/TAKEN: CPT | Performed by: INTERNAL MEDICINE

## 2024-06-11 PROCEDURE — 71046 X-RAY EXAM CHEST 2 VIEWS: CPT

## 2024-06-11 PROCEDURE — 4004F PT TOBACCO SCREEN RCVD TLK: CPT | Performed by: INTERNAL MEDICINE

## 2024-06-11 PROCEDURE — 71046 X-RAY EXAM CHEST 2 VIEWS: CPT | Performed by: RADIOLOGY

## 2024-06-11 PROCEDURE — 3075F SYST BP GE 130 - 139MM HG: CPT | Performed by: INTERNAL MEDICINE

## 2024-06-11 RX ORDER — BENZONATATE 200 MG/1
200 CAPSULE ORAL 3 TIMES DAILY PRN
Qty: 42 CAPSULE | Refills: 0 | Status: SHIPPED | OUTPATIENT
Start: 2024-06-11 | End: 2024-07-11

## 2024-06-11 RX ORDER — PREDNISONE 10 MG/1
TABLET ORAL DAILY
Qty: 30 TABLET | Refills: 0 | Status: SHIPPED | OUTPATIENT
Start: 2024-06-11 | End: 2024-06-21

## 2024-06-11 RX ORDER — GUAIFENESIN 600 MG/1
1200 TABLET, EXTENDED RELEASE ORAL 2 TIMES DAILY
Qty: 120 TABLET | Refills: 11 | Status: SHIPPED | OUTPATIENT
Start: 2024-06-11 | End: 2025-06-11

## 2024-06-11 RX ORDER — AZITHROMYCIN 500 MG/1
500 TABLET, FILM COATED ORAL DAILY
Qty: 5 TABLET | Refills: 0 | Status: SHIPPED | OUTPATIENT
Start: 2024-06-11 | End: 2024-06-16

## 2024-06-11 ASSESSMENT — PATIENT HEALTH QUESTIONNAIRE - PHQ9
SUM OF ALL RESPONSES TO PHQ9 QUESTIONS 1 AND 2: 0
1. LITTLE INTEREST OR PLEASURE IN DOING THINGS: NOT AT ALL
2. FEELING DOWN, DEPRESSED OR HOPELESS: NOT AT ALL

## 2024-06-11 ASSESSMENT — ENCOUNTER SYMPTOMS
DEPRESSION: 0
CARDIOVASCULAR NEGATIVE: 1
HEMATOLOGIC/LYMPHATIC NEGATIVE: 1
ALLERGIC/IMMUNOLOGIC NEGATIVE: 1
OCCASIONAL FEELINGS OF UNSTEADINESS: 0
EYES NEGATIVE: 1
GASTROINTESTINAL NEGATIVE: 1
ENDOCRINE NEGATIVE: 1
CONSTITUTIONAL NEGATIVE: 1
COUGH: 1
PSYCHIATRIC NEGATIVE: 1
LOSS OF SENSATION IN FEET: 0
NEUROLOGICAL NEGATIVE: 1
MUSCULOSKELETAL NEGATIVE: 1

## 2024-06-11 NOTE — PROGRESS NOTES
Subjective   Patient ID: Cb Clayton is a 58 y.o. male who presents for Follow-up (3 month/). Cough for 2 weeks and sore throat and denies fever and denies chills and has mild shortness of breath and has a history of asthma     HPI     Review of Systems   Constitutional: Negative.    HENT: Negative.     Eyes: Negative.    Respiratory:  Positive for cough.    Cardiovascular: Negative.    Gastrointestinal: Negative.    Endocrine: Negative.    Musculoskeletal: Negative.    Skin: Negative.    Allergic/Immunologic: Negative.    Neurological: Negative.    Hematological: Negative.    Psychiatric/Behavioral: Negative.     All other systems reviewed and are negative.      Objective   Pulse 64   Resp 18   Ht 1.829 m (6')   Wt 108 kg (238 lb)   SpO2 97%   BMI 32.28 kg/m²   Blood pressure 130/70, pulse 64, resp. rate 18, height 1.829 m (6'), weight 108 kg (238 lb), SpO2 97%.   Physical Exam  Vitals and nursing note reviewed.   Constitutional:       Appearance: Normal appearance.   HENT:      Head: Normocephalic and atraumatic.      Right Ear: Tympanic membrane, ear canal and external ear normal.      Left Ear: Tympanic membrane, ear canal and external ear normal. There is no impacted cerumen.      Nose: Nose normal.      Mouth/Throat:      Mouth: Mucous membranes are moist.      Pharynx: Oropharynx is clear.   Eyes:      Extraocular Movements: Extraocular movements intact.      Conjunctiva/sclera: Conjunctivae normal.      Pupils: Pupils are equal, round, and reactive to light.   Cardiovascular:      Rate and Rhythm: Normal rate and regular rhythm.      Pulses: Normal pulses.      Heart sounds: Normal heart sounds. No murmur heard.  Pulmonary:      Effort: Pulmonary effort is normal. No respiratory distress.      Breath sounds: Normal breath sounds. No stridor. No wheezing, rhonchi or rales.   Chest:      Chest wall: No tenderness.   Abdominal:      General: Abdomen is flat. Bowel sounds are normal. There is no  distension.      Palpations: Abdomen is soft. There is no mass.      Tenderness: There is no abdominal tenderness. There is no right CVA tenderness, left CVA tenderness, guarding or rebound.      Hernia: No hernia is present.   Musculoskeletal:         General: Normal range of motion.      Cervical back: Normal range of motion and neck supple.   Skin:     General: Skin is warm.      Capillary Refill: Capillary refill takes less than 2 seconds.   Neurological:      General: No focal deficit present.      Mental Status: He is alert.      Cranial Nerves: No cranial nerve deficit.      Sensory: No sensory deficit.      Motor: No weakness.      Coordination: Coordination normal.      Gait: Gait normal.      Deep Tendon Reflexes: Reflexes normal.   Psychiatric:         Mood and Affect: Mood normal.         Behavior: Behavior normal. Behavior is cooperative.         Thought Content: Thought content normal.         Judgment: Judgment normal.         Assessment/Plan   Problem List Items Addressed This Visit             ICD-10-CM    Hypertension I10     HTN - hypertension well/controlled .Target BP < 130/80 achieved. Educate low salt diet and exercise with weight loss. Educate home self monitoring and diary keeping. Educate risks of elevate blood pressure and benefits of prompt treatment. Refill carvedilol and candesartan and amlodipine          Controlled type 2 diabetes mellitus without complication, without long-term current use of insulin (Multi) E11.9     DM - NIDDM  . Reviewed with patient / Will check  HbA1c and fasting blood sugars. Educate home self monitoring and diary keeping(reviewed with patient home blood sugar levels /diary). Educate extensively low calorie diet and weight loss with exercise. Reviewed BS- diary and Rx. Regimen. [place] renal protection with ARB/ACEI.               Educate compliance with diet and Rx. And educate risks and autcomes.                                                 Moderate  persistent asthma ([facility]) - Primary J45.40     Due to acute bronchitis and subsequenbt cough   Bronchitis with wheezing                                           Recommend:                                                                                                      mild  Pharyngitis,                                                                                                                                                                               Start:   Azithromycin 500 mg daily for 6 days  -                                                                                                                                                             Asthma/COPD exacerbation       Allegra 180mg qD vs. Claritin 10 mg qD and Mucinex                                                                                                                                             Cough  - start Prednisone 10 mg II BID for 3 days and II qD for 3 days and I qD for 3 days -  Avoid cold exposure and take vitamins C 500 and B complex  qD   Hycodan syrup one tea spoon qHS 4 oz.     Sinusitis - allergic vs. infectious - start Allegra and Flonase I BID and Azithromycin 500 mg qD for 6 days. and avoid cold exposure.           Cough due to bronchospasm J98.01     Due to acute bronchitis and subsequenbt cough   Bronchitis with wheezing                                           Recommend:                                                                                                      mild  Pharyngitis,                                                                                                                                                                               Start:   Azithromycin 500 mg daily for 6 days  -                                                                                                                                                             Asthma/COPD exacerbation        Allegra 180mg qD vs. Claritin 10 mg qD and Mucinex                                                                                                                                             Cough  - start Prednisone 10 mg II BID for 3 days and II qD for 3 days and I qD for 3 days -  Avoid cold exposure and take vitamins C 500 and B complex  qD   Hycodan syrup one tea spoon qHS 4 oz.     Sinusitis - allergic vs. infectious - start Allegra and Flonase I BID and Azithromycin 500 mg qD for 6 days. and avoid cold exposure.           Relevant Medications    azithromycin (Zithromax) 500 mg tablet    predniSONE (Deltasone) 10 mg tablet    benzonatate (Tessalon) 200 mg capsule    guaiFENesin (Mucinex) 600 mg 12 hr tablet    Other Relevant Orders    XR chest 2 views         CAD (coronary artery disease), native coronary artery I25.10        CAD-coronary artery disease- Status Post STENT in CX in December 2023 and in 2021 patient has a history of myocardial infarction/stent placed in stenosed coronary arteries. Target LDL should be below 70 milligrams per deciliter. Reviewed EKG which shows no significant changes. Follows with his cardiologist/ Dr. Meadows. He needs to call us with any new symptoms of angina or shortness of breath. Last stress test was/last coronary catheterization was/. Need to address risk factors BioCore controlling cholesterol blood pressure and diabetes. Educate extensively diet. Patient needs to follow in rehabilitation/mild exercises daily.            Relevant Medications     amLODIPine (Norvasc) 10 mg tablet     clopidogrel (Plavix) 75 mg tablet     Lumbar disc disease with radiculopathy M51.16     Hypercholesterolemia E78.00       Hypercholesterolemia - Monitor lipid profile and educate patient upon risks of high cholesterol and targets. Educate diet and change in lifestyle and increase in exercises - Refill:  Atorvastatin   80 mg daily in light of his stent in Cx  and educate compliance with  medication and diet.               Asthma J45.909     Relevant Medications     albuterol 90 mcg/actuation inhaler     Insulin resistance E88.819       Educate extensively low carbohydrate diet AND LOW FAT DIET AND increase in exercise activity  and weight loss program and monitor HbA1C and glucose levels and consider Metformin 500 mg BID with meals              Thrombocytopenia (CMS/HCC) D69.6       Monitor CBC and Asymptomatic  now            Relevant Medications     clopidogrel (Plavix) 75 mg tablet                     Benign essential hypertension - Primary I10           HTN - hypertension well/controlled .Target BP < 130/80  achieved. Educate low salt diet and exercise with weight loss. Educate home self monitoring and diary keeping. Educate risks of elevate blood pressure and benefits of prompt treatment.  Refill            CAD (coronary artery disease), native coronary artery I25.10         CAD-coronary artery disease-patient has a history of myocardial infarction/stent placed in stenosed coronary arteries. Target LDL should be below 70 milligrams per deciliter. Reviewed EKG which shows no significant changes. Follows with his cardiologist/ Dr. Meadows. He needs to call us with any new symptoms of angina or shortness of breath. Last stress test was/last coronary catheterization was/. Need to address risk factors BioCore controlling cholesterol blood pressure and diabetes. Educate extensively diet. Patient needs to follow in rehabilitation/mild exercises daily.              Relevant Medications       aspirin 81 mg EC tablet       Lumbar disc disease with radiculopathy M51.16         DDD - Degenerative disc disease of the Lumbo-Sacral (LS)/Cervical (C)  spine. Educate exercises and referred patient to Physical Therapy (PT). Ordered X-Ray's of the LS/C spine. Consider MRI. Radiculopathy in the distribution of L4-L5-S1/C3-C4-C5 nerve roots, needs NSAIDS (Naprosin 500mg BID vs. Arthrotec 75mg BID or Prednisone taper  (Medrol dose pack), Flexeril 10 mg qHS, heat application, and pain control with Tylenol vs. Vicodin 5/325 mg TID.             Hypercholesterolemia E78.00         Hypercholesterolemia - Monitor lipid profile and educate patient upon risks of high cholesterol and targets. Educate diet and change in lifestyle and increase in exercises - Refill:  Atorvastatin   80 mg daily in light of his stent in Cx  and educate compliance with medication and diet.              Controlled type 2 diabetes mellitus without complication, without long-term current use of insulin (CMS/[facility]) E11.9         DM - NIDDM  . Reviewed with patient / Will check  HbA1c and fasting blood sugars. Educate home self monitoring and diary keeping(reviewed with patient home blood sugar levels /diary). Educate extensively low calorie diet and weight loss with exercise. Reviewed BS- diary and Rx. Regimen. [place] renal protection with ARB/ACEI.               Educate compliance with diet and Rx. And educate risks and autcomes.                                                                           Hypercholesterolemia E78.00           Hypercholesterolemia - Monitor lipid profile and educate patient upon risks of high cholesterol and targets. Educate diet and change in lifestyle and increase in exercises - Refill:  Atorvastatin   80 mg daily in light of his stent in Cx  and educate compliance with medication and diet.               Controlled type 2 diabetes mellitus without complication, without long-term current use of insulin (CMS/[facility]) E11.9         DM - NIDDM  . Reviewed with patient / Will check  HbA1c and fasting blood sugars. Educate home self monitoring and diary keeping(reviewed with patient home blood sugar levels /diary). Educate extensively low calorie diet and weight loss with exercise. Reviewed BS- diary and Rx. Regimen. [place] renal protection with ARB/ACEI.               Educate compliance with diet and Rx. And educate risks and autcomes.                                                      Myocardial infarction (CMS/[facility]) I21.9         Status Post MI 10 days ago  had chest pains has a history of CAD and had a Stent placed in the 100% blocked coronary Circumflex artery follows with cardiology and start the Atorvastatin   80 mg daily and address risk factors and educate the Patient  - educate tobacco cessation and control blood pressure and cholesterol              Relevant Medications       carvedilol (Coreg) 12.5 mg tablet       nitroglycerin (Nitrostat) 0.4 mg SL tablet       sildenafil (Viagra) 100 mg tablet       ticagrelor (Brilinta) 90 mg tablet       Hospitalization within last 30 days - Primary Z92.89         reviewed the hospital course and reconcile her medications  for CAD and Status Post MI  =- Status Post MI 10 days ago  had chest pains has a history of CAD and had a Stent placed in the 100% blocked coronary Circumflex artery                              Benign essential hypertension I10           HTN - hypertension well/controlled .Target BP < 130/80  achieved. Educate low salt diet and exercise with weight loss. Educate home self monitoring and diary keeping. Educate risks of elevate blood pressure and benefits of prompt treatment.  Refill              Degenerative disc disease, cervical - Primary M50.30         DDD - Degenerative disc disease of the )/Cervical (C)  spine. Educate exercises and referred patient to Physical Therapy (PT). Ordered X-Ray's of the /C spine. Consider MRI. Radiculopathy in the distribution of C4-C5-C6 nerve roots, needs NSAIDS (Naprosin 500mg BID vs. Arthrotec 75mg BID or Prednisone taper (Medrol dose pack), Flexeril 10 mg qHS, heat application, and pain control with Tylenol 325 mg TID.               Relevant Orders       Referral to Physical Therapy       Rotator cuff tear M75.100         Rotator Cuff  Syndrome - Left  Shoulder. Tender abduction/adduction and internal/external rotation of the R/L shoulder.  Recommend MRI of the affected shoulder and Physical Therapy. Consider Orthopedic consult if no improvement. Also Recommend limitation of all activities targeting the overuse of the rotator cuff.              Relevant Orders       Referral to Physical Therapy       Annual physical exam Z00.00         Overall stable and monitor blood pressure and cervical disc disease and left shoulder pain due to trauma at work      Preventive measures - Recommend ASAP : Last  Colonoscopy 2 years (educate patient risks of colon cancer) refer patient to GI specialist. Ophthalmology and retina exam recommend yearly exams refer patient to an Ophthalmologist. BPH - (Benign Prostatic Hypertrophy) refer patient to an Urologist for rectal exam and PSA check.      Refuses vaccinations of all kind           Relevant Orders       Comprehensive Metabolic Panel       CBC and Auto Differential       Acquired hypothyroidism E03.9       Relevant Orders       TSH with reflex to Free T4 if abnormal       Benign prostatic hyperplasia without lower urinary tract symptoms N40.0       Relevant Orders       Prostate Specific Antigen        Other Visit Diagnoses           Codes     Shortness of breath     R06.02     Relevant Medications     albuterol 90 mcg/actuation inhaler     predniSONE (Deltasone) 20 mg tablet     Hypertension, unspecified type     I10     Relevant Medications     allopurinol (Zyloprim) 300 mg tablet     amLODIPine (Norvasc) 10 mg tablet     hydroCHLOROthiazide (HYDRODiuril) 25 mg tablet     Acute gout of foot, unspecified cause, unspecified laterality     M10.9     Relevant Medications     colchicine, gout, 0.6 mg tablet     Cholesterol depletion     E78.6     Relevant Medications     ezetimibe (Zetia) 10 mg tablet     Other Relevant Orders     Lipid Panel                HTN - hypertension well/controlled.Target BP < 130/80 well/not achieved. Educate low salt diet and exercise with weight loss. Educate home self monitoring and diary  keeping. Educate risks of elevate blood pressure and benefits of prompt treatment. Refill:      Hypercholesterolemia - Monitor lipid profile and educate patient upon risks of high cholesterol and targets. Educate diet and change in lifestyle and increase in exercises - Refill: and educate compliance with medication and diet.      Erection Disorder - Erection Disorder - Viagra/Sildenafil 100 mg qD PRN vs. Cialis 20 mg PRN vs. Levitra 20 mg PRN. Educate Exercises (walking and Jogging to Improve Pelvic Blood flow and decrease the size of the Prostate ). Also Recommend Vitamin E 400 to 800 IU qD with Meals. Avoids ETOH or ETOH abuse and control tightly the blood sugars and Diabetes to Prevent Neuropathy.      DDD - Degenerative disc disease of the )/Cervical (C) spine. Educate exercises and referred patient to Physical Therapy (PT). Ordered X-Ray's of the /C spine. Consider MRI. Radiculopathy in the distribution of C4-C5-C6 nerve roots, needs NSAIDS (Naprosin 500mg BID vs. Arthrotec 75mg BID or Prednisone taper (Medrol dose pack), Flexeril 10 mg qHS, heat application, and pain control with Tylenol vs. Vicodin 5/325 mg TID.      CAD-coronary artery disease-patient has a history of Coronary artery disease -. Target LDL should be below 70 milligrams per deciliter. Reviewed angiogram and status post stent. Follows with his cardiologist and needs a follow up heart catheterization. He needs to call us with any new symptoms of angina or shortness of breath. Last coronary catheterization/Calcium scoring was/ in. Need to address risk factors: cholesterol, blood pressure and diabetes. Educate extensively diet. Patient needs to follow in rehabilitation/mild exercises daily.     DDD - Degenerative disc disease of the Lumbo-Sacral (LS)/ spine. Educate exercises and referred patient to Physical Therapy (PT). Ordered X-Ray's of the LS/ spine. Consider MRI.      Radiculopathy in the distribution of L4-L5-S1/ nerve roots, needs NSAIDS  (Naprosin 500mg BID vs. Arthrotec 75mg BID or Prednisone taper (Medrol dose pack), Flexeril 10 mg qHS, heat application, and pain control with Tylenol and Naprosyn 500 mg BID. Educate exercises and referred the patient to PT (Physical Therapy). Get X-Ray's.      Right wrsit swelling - due to arthritis vs. strain vs. gout vs. tendonitis - recommend: heat application and Voltaren gel topically and Reviewed with the patient the the X-rays and run a arthritis panel      DDD - Degenerative disc disease of the Lumbo-Sacral (LS)/ spine. Educate exercises and referred patient to Physical Therapy (PT). Ordered X-Ray's of the LS/ spine. Consider MRI.      Radiculopathy in the distribution of L4-L5-S1/ nerve roots, needs NSAIDS (Naprosin 500mg BID vs. Arthrotec 75mg BID or Prednisone taper (Medrol dose pack), Flexeril 10 mg qHS, heat application, and pain control with Tylenol and Naprosyn 500 mg BID. Educate exercises and referred the patient to PT (Physical Therapy). Get X-Ray's.      Chronic gout into the right first MCP joint - with pain and swelling and redness - educate extensively diet and start Allopurinol 300 m TID _ Colchicine 0.6 mg daily and Allopurinol 300 mg daily -= and pain control with Tylenol -      Left shoulder pain and rotator cuff syndrome possibly associated with Degenerative Joint Disease vs. Chronic Arthritis, of the right shoulder. Pain control, and antiinflammatory medications : consider Kenalog injection and start Voltaren gel 1% topically BID, and Tylenol 325 mg TID PRN. Educate exercises and referred the patient to PT (Physical Therapy). Get X-Ray's. improving symptoms and Educate extensively exercises and stretches and consider MRI of the right shoulder before Kenalog injection      Right elbow contusion - and pain -= recommend: X-rays and bracing - still has some discomfort      Erection Disorder - Viagra 100 mg qD PRN vs. Cialis 20 mg PRN vs. Levitra 20 mg PRN. Educate Exercises (walking and  Jogging to Improve Pelvic Blood flow and decrease the size of the Prostate ). Also Recommend Vitamin E 400 to 800 IU qD with Meals. Avoids ETOH or ETOH abuse and control tightly the blood sugars and Diabetes to Prevent Neuropathy.                                        Immunizations/Injections     [facility] Purple Cap SARS-CoV-211/3/2021, 10/5/2021  Pneumococcal polysaccharide vaccine, 23-valent, age 2 years and older (PNEUMOVAX 23)8/17/2016

## 2024-06-11 NOTE — ASSESSMENT & PLAN NOTE
Due to acute bronchitis and subsequenbt cough   Bronchitis with wheezing                                           Recommend:                                                                                                      mild  Pharyngitis,                                                                                                                                                                               Start:   Azithromycin 500 mg daily for 6 days  -                                                                                                                                                             Asthma/COPD exacerbation       Allegra 180mg qD vs. Claritin 10 mg qD and Mucinex                                                                                                                                             Cough  - start Prednisone 10 mg II BID for 3 days and II qD for 3 days and I qD for 3 days -  Avoid cold exposure and take vitamins C 500 and B complex  qD   Hycodan syrup one tea spoon qHS 4 oz.     Sinusitis - allergic vs. infectious - start Allegra and Flonase I BID and Azithromycin 500 mg qD for 6 days. and avoid cold exposure.

## 2024-06-11 NOTE — ASSESSMENT & PLAN NOTE
DM - NIDDM  . Reviewed with patient / Will check  HbA1c and fasting blood sugars. Educate home self monitoring and diary keeping(reviewed with patient home blood sugar levels /diary). Educate extensively low calorie diet and weight loss with exercise. Reviewed BS- diary and Rx. Regimen. [place] renal protection with ARB/ACEI.               Educate compliance with diet and Rx. And educate risks and autcomes.

## 2024-06-11 NOTE — ASSESSMENT & PLAN NOTE
HTN - hypertension well/controlled .Target BP < 130/80 achieved. Educate low salt diet and exercise with weight loss. Educate home self monitoring and diary keeping. Educate risks of elevate blood pressure and benefits of prompt treatment. Refill carvedilol and candesartan and amlodipine

## 2024-07-01 DIAGNOSIS — J45.909 MODERATE ASTHMA, UNSPECIFIED WHETHER COMPLICATED, UNSPECIFIED WHETHER PERSISTENT (HHS-HCC): ICD-10-CM

## 2024-07-01 RX ORDER — ALBUTEROL SULFATE 90 UG/1
2 AEROSOL, METERED RESPIRATORY (INHALATION) EVERY 6 HOURS PRN
Qty: 18 G | Refills: 2 | Status: SHIPPED | OUTPATIENT
Start: 2024-07-01

## 2024-07-25 DIAGNOSIS — I10 HYPERTENSION, UNSPECIFIED TYPE: ICD-10-CM

## 2024-07-25 RX ORDER — HYDROCHLOROTHIAZIDE 25 MG/1
25 TABLET ORAL DAILY
Qty: 90 TABLET | Refills: 0 | Status: SHIPPED | OUTPATIENT
Start: 2024-07-25

## 2024-08-04 DIAGNOSIS — I25.118 CORONARY ARTERY DISEASE OF NATIVE ARTERY OF NATIVE HEART WITH STABLE ANGINA PECTORIS (CMS-HCC): ICD-10-CM

## 2024-08-05 RX ORDER — ASPIRIN 81 MG/1
81 TABLET ORAL DAILY
Qty: 90 TABLET | Refills: 0 | Status: SHIPPED | OUTPATIENT
Start: 2024-08-05

## 2024-08-21 PROCEDURE — 87086 URINE CULTURE/COLONY COUNT: CPT

## 2024-08-22 ENCOUNTER — LAB REQUISITION (OUTPATIENT)
Dept: LAB | Facility: HOSPITAL | Age: 59
End: 2024-08-22
Payer: COMMERCIAL

## 2024-08-22 DIAGNOSIS — M54.50 LOW BACK PAIN, UNSPECIFIED: ICD-10-CM

## 2024-08-22 DIAGNOSIS — R82.79 OTHER ABNORMAL FINDINGS ON MICROBIOLOGICAL EXAMINATION OF URINE: ICD-10-CM

## 2024-08-22 PROCEDURE — 87491 CHLMYD TRACH DNA AMP PROBE: CPT

## 2024-08-22 PROCEDURE — 87086 URINE CULTURE/COLONY COUNT: CPT

## 2024-08-22 PROCEDURE — 87661 TRICHOMONAS VAGINALIS AMPLIF: CPT

## 2024-08-22 PROCEDURE — 87591 N.GONORRHOEAE DNA AMP PROB: CPT

## 2024-08-23 ENCOUNTER — LAB REQUISITION (OUTPATIENT)
Dept: LAB | Facility: HOSPITAL | Age: 59
End: 2024-08-23
Payer: COMMERCIAL

## 2024-08-23 DIAGNOSIS — R82.79 OTHER ABNORMAL FINDINGS ON MICROBIOLOGICAL EXAMINATION OF URINE: ICD-10-CM

## 2024-08-23 DIAGNOSIS — M54.50 LOW BACK PAIN, UNSPECIFIED: ICD-10-CM

## 2024-08-23 LAB
BACTERIA UR CULT: NO GROWTH
C TRACH RRNA SPEC QL NAA+PROBE: NEGATIVE
N GONORRHOEA DNA SPEC QL PROBE+SIG AMP: NEGATIVE
T VAGINALIS RRNA SPEC QL NAA+PROBE: NEGATIVE

## 2024-08-24 LAB — BACTERIA UR CULT: NO GROWTH

## 2024-09-05 ENCOUNTER — TELEPHONE (OUTPATIENT)
Dept: FAMILY MEDICINE | Facility: OTHER | Age: 59
End: 2024-09-05
Payer: COMMERCIAL

## 2024-09-05 DIAGNOSIS — F52.8 OTHER SEXUAL DYSFUNCTION NOT DUE TO A SUBSTANCE OR KNOWN PHYSIOLOGICAL CONDITION: ICD-10-CM

## 2024-09-05 RX ORDER — TADALAFIL 20 MG/1
TABLET ORAL
Qty: 30 TABLET | Refills: 0 | Status: SHIPPED | OUTPATIENT
Start: 2024-09-05

## 2024-09-05 RX ORDER — TADALAFIL 20 MG/1
TABLET ORAL
Qty: 30 TABLET | Refills: 0 | OUTPATIENT
Start: 2024-09-05

## 2024-09-05 NOTE — TELEPHONE ENCOUNTER
[id number] sent Rx request for the following:      Requested Prescriptions   Pending Prescriptions Disp Refills    tadalafil (CIALIS) 20 MG tablet [Pharmacy Med Name: Tadalafil 20 MG Oral Tablet] 30 tablet 0     Sig: TAKE 1/2 (ONE-HALF) TABLET BY MOUTH ONCE DAILY AS NEEDED       Erectile Dysfuction Protocol Failed - 9/5/2024  9:55 AM        Failed - Recent (12 mo) or future (90 days) visit within the authorizing provider's specialty        Failed - Medicatin indicated for associated diagnosis     Medication is associated with one or more of the following diagnoses:   Benign prostatic hyperplasia  Erectile dysfunction  Female sexual arousal disorder  Pulmonary hypertension  Raynaud s  Sexual Dysfunction     Last Prescription Date:   8/22/23  Last Fill Qty/Refills:         30, R-4    Last Office Visit:              8/22/23   Future Office visit:           None    Pt due for annual exam. Routing to provider for refill consideration. Routing to Unit scheduling pool, to assist Pt in scheduling appointment. Unable to complete prescription refill per RN Medication Refill Policy. Zahida Griggs RN .............. 9/5/2024  10:19 AM

## 2024-09-05 NOTE — TELEPHONE ENCOUNTER
Duplicate request. Medication refilled today. Called and informed patient who verbalized understanding.   Kristen Cai RN on 9/5/2024 at 3:57 PM

## 2024-09-05 NOTE — TELEPHONE ENCOUNTER
Reason for call: Medication or medication refill    Have you contacted your pharmacy regarding this refill? Yes    If No, direct the patient to contact the Pharmacy and discontinue telephone note using Erroneous Encounter.  If Yes, continue.    Name of medication requested: Cialis - looking to get a refill. Hoping to not have to make an appt at $358.    How many days of medication do you have left? A little bit    What pharmacy do you use? [facility]    Preferred method for responding to this message: Telephone Call    Phone number patient can be reached at: Other phone number:  [phone]    If we cannot reach you directly, may we leave a detailed response at the number you provided? Yes     Sara Banda on 9/5/2024 at 3:40 PM

## 2024-09-10 DIAGNOSIS — I10 HYPERTENSION, UNSPECIFIED TYPE: ICD-10-CM

## 2024-09-10 RX ORDER — AMLODIPINE BESYLATE 10 MG/1
10 TABLET ORAL DAILY
Qty: 90 TABLET | Refills: 0 | Status: SHIPPED | OUTPATIENT
Start: 2024-09-10 | End: 2024-09-11 | Stop reason: SDUPTHER

## 2024-09-11 DIAGNOSIS — I10 HYPERTENSION, UNSPECIFIED TYPE: ICD-10-CM

## 2024-09-11 RX ORDER — AMLODIPINE BESYLATE 10 MG/1
10 TABLET ORAL DAILY
Qty: 90 TABLET | Refills: 0 | Status: SHIPPED | OUTPATIENT
Start: 2024-09-11

## 2024-09-12 ENCOUNTER — APPOINTMENT (OUTPATIENT)
Dept: PRIMARY CARE | Facility: CLINIC | Age: 59
End: 2024-09-12
Payer: COMMERCIAL

## 2024-09-12 ENCOUNTER — LAB (OUTPATIENT)
Dept: LAB | Facility: LAB | Age: 59
End: 2024-09-12
Payer: COMMERCIAL

## 2024-09-12 ENCOUNTER — TELEPHONE (OUTPATIENT)
Dept: PRIMARY CARE | Facility: CLINIC | Age: 59
End: 2024-09-12

## 2024-09-12 VITALS
HEART RATE: 68 BPM | WEIGHT: 238 LBS | RESPIRATION RATE: 17 BRPM | OXYGEN SATURATION: 99 % | SYSTOLIC BLOOD PRESSURE: 130 MMHG | HEIGHT: 72 IN | BODY MASS INDEX: 32.23 KG/M2 | DIASTOLIC BLOOD PRESSURE: 80 MMHG

## 2024-09-12 DIAGNOSIS — I25.118 CORONARY ARTERY DISEASE OF NATIVE ARTERY OF NATIVE HEART WITH STABLE ANGINA PECTORIS (CMS-HCC): Primary | ICD-10-CM

## 2024-09-12 DIAGNOSIS — E78.00 HYPERCHOLESTEROLEMIA: ICD-10-CM

## 2024-09-12 DIAGNOSIS — M10.9 ACUTE GOUTY ARTHROPATHY: ICD-10-CM

## 2024-09-12 DIAGNOSIS — R53.83 OTHER FATIGUE: ICD-10-CM

## 2024-09-12 DIAGNOSIS — M96.1 LUMBAR POST-LAMINECTOMY SYNDROME: ICD-10-CM

## 2024-09-12 DIAGNOSIS — R21 RASH: ICD-10-CM

## 2024-09-12 DIAGNOSIS — N40.0 BENIGN PROSTATIC HYPERPLASIA WITHOUT LOWER URINARY TRACT SYMPTOMS: ICD-10-CM

## 2024-09-12 DIAGNOSIS — I25.118 CORONARY ARTERY DISEASE OF NATIVE ARTERY OF NATIVE HEART WITH STABLE ANGINA PECTORIS (CMS-HCC): ICD-10-CM

## 2024-09-12 DIAGNOSIS — I10 PRIMARY HYPERTENSION: ICD-10-CM

## 2024-09-12 DIAGNOSIS — E11.9 CONTROLLED TYPE 2 DIABETES MELLITUS WITHOUT COMPLICATION, WITHOUT LONG-TERM CURRENT USE OF INSULIN (MULTI): ICD-10-CM

## 2024-09-12 DIAGNOSIS — I10 BENIGN ESSENTIAL HYPERTENSION: ICD-10-CM

## 2024-09-12 PROCEDURE — 3079F DIAST BP 80-89 MM HG: CPT | Performed by: INTERNAL MEDICINE

## 2024-09-12 PROCEDURE — 4004F PT TOBACCO SCREEN RCVD TLK: CPT | Performed by: INTERNAL MEDICINE

## 2024-09-12 PROCEDURE — 84443 ASSAY THYROID STIM HORMONE: CPT

## 2024-09-12 PROCEDURE — 80053 COMPREHEN METABOLIC PANEL: CPT

## 2024-09-12 PROCEDURE — 4010F ACE/ARB THERAPY RXD/TAKEN: CPT | Performed by: INTERNAL MEDICINE

## 2024-09-12 PROCEDURE — 99214 OFFICE O/P EST MOD 30 MIN: CPT | Performed by: INTERNAL MEDICINE

## 2024-09-12 PROCEDURE — 86431 RHEUMATOID FACTOR QUANT: CPT

## 2024-09-12 PROCEDURE — 86038 ANTINUCLEAR ANTIBODIES: CPT

## 2024-09-12 PROCEDURE — 85652 RBC SED RATE AUTOMATED: CPT

## 2024-09-12 PROCEDURE — 84153 ASSAY OF PSA TOTAL: CPT

## 2024-09-12 PROCEDURE — 3008F BODY MASS INDEX DOCD: CPT | Performed by: INTERNAL MEDICINE

## 2024-09-12 PROCEDURE — 3075F SYST BP GE 130 - 139MM HG: CPT | Performed by: INTERNAL MEDICINE

## 2024-09-12 PROCEDURE — 85025 COMPLETE CBC W/AUTO DIFF WBC: CPT

## 2024-09-12 PROCEDURE — 36415 COLL VENOUS BLD VENIPUNCTURE: CPT

## 2024-09-12 PROCEDURE — 80061 LIPID PANEL: CPT

## 2024-09-12 PROCEDURE — 84550 ASSAY OF BLOOD/URIC ACID: CPT

## 2024-09-12 RX ORDER — MOMETASONE FUROATE 1 MG/G
OINTMENT TOPICAL DAILY
Qty: 45 G | Refills: 0 | Status: SHIPPED | OUTPATIENT
Start: 2024-09-12

## 2024-09-12 ASSESSMENT — ENCOUNTER SYMPTOMS
CARDIOVASCULAR NEGATIVE: 1
ENDOCRINE NEGATIVE: 1
RESPIRATORY NEGATIVE: 1
HEMATOLOGIC/LYMPHATIC NEGATIVE: 1
GASTROINTESTINAL NEGATIVE: 1
NEUROLOGICAL NEGATIVE: 1
MUSCULOSKELETAL NEGATIVE: 1
EYES NEGATIVE: 1
ALLERGIC/IMMUNOLOGIC NEGATIVE: 1
CONSTITUTIONAL NEGATIVE: 1
PSYCHIATRIC NEGATIVE: 1

## 2024-09-12 NOTE — ASSESSMENT & PLAN NOTE
>>ASSESSMENT AND PLAN FOR HYPERTENSION WRITTEN ON 9/12/2024  9:39 AM BY HOWIE WILLIAM MD    HTN - hypertension well/controlled .Target BP < 130/80 achieved. Educate low salt diet and exercise with weight loss. Educate home self monitoring and diary keeping. Educate risks of elevate blood pressure and benefits of prompt treatment. Refill carvedilol and candesartan and amlodipine     >>ASSESSMENT AND PLAN FOR BENIGN ESSENTIAL HYPERTENSION WRITTEN ON 9/12/2024  9:39 AM BY HOWIE WILLIAM MD    HTN - hypertension well/controlled .Target BP < 130/80  achieved. Educate low salt diet and exercise with weight loss. Educate home self monitoring and diary keeping. Educate risks of elevate blood pressure and benefits of prompt treatment.  Refill

## 2024-09-12 NOTE — ASSESSMENT & PLAN NOTE
BPH - Benign PROSTATIC Hypertrophy, + Dysuria/Nocturia, check PSA, prescribe Flomax 0.4mg qD and Avodart 0.5 mg qD

## 2024-09-12 NOTE — PROGRESS NOTES
Subjective   Patient ID: Cb Clayton is a 58 y.o. male who presents for Follow-up (3 month follow up). Generalized joint pains mostly in the elbow and wrists     HPI     Review of Systems   Constitutional: Negative.    HENT: Negative.     Eyes: Negative.    Respiratory: Negative.     Cardiovascular: Negative.    Gastrointestinal: Negative.    Endocrine: Negative.    Musculoskeletal: Negative.    Skin: Negative.    Allergic/Immunologic: Negative.    Neurological: Negative.    Hematological: Negative.    Psychiatric/Behavioral: Negative.     All other systems reviewed and are negative.      Objective   /80   Pulse 68   Resp 17   Ht 1.829 m (6')   Wt 108 kg (238 lb)   SpO2 99%   BMI 32.28 kg/m²   Blood pressure 130/80, pulse 68, resp. rate 17, height 1.829 m (6'), weight 108 kg (238 lb), SpO2 99%.   Physical Exam  Vitals and nursing note reviewed.   Constitutional:       Appearance: Normal appearance.   HENT:      Head: Normocephalic and atraumatic.      Right Ear: Tympanic membrane, ear canal and external ear normal.      Left Ear: Tympanic membrane, ear canal and external ear normal. There is no impacted cerumen.      Nose: Nose normal.      Mouth/Throat:      Mouth: Mucous membranes are moist.      Pharynx: Oropharynx is clear.   Eyes:      Extraocular Movements: Extraocular movements intact.      Conjunctiva/sclera: Conjunctivae normal.      Pupils: Pupils are equal, round, and reactive to light.   Cardiovascular:      Rate and Rhythm: Normal rate and regular rhythm.      Pulses: Normal pulses.      Heart sounds: Normal heart sounds. No murmur heard.  Pulmonary:      Effort: Pulmonary effort is normal. No respiratory distress.      Breath sounds: Normal breath sounds. No stridor. No wheezing, rhonchi or rales.   Chest:      Chest wall: No tenderness.   Abdominal:      General: Abdomen is flat. Bowel sounds are normal. There is no distension.      Palpations: Abdomen is soft. There is no mass.       Tenderness: There is no abdominal tenderness. There is no right CVA tenderness, left CVA tenderness, guarding or rebound.      Hernia: No hernia is present.   Musculoskeletal:         General: Normal range of motion.      Cervical back: Normal range of motion and neck supple.   Skin:     General: Skin is warm.      Capillary Refill: Capillary refill takes less than 2 seconds.   Neurological:      General: No focal deficit present.      Mental Status: He is alert.      Cranial Nerves: No cranial nerve deficit.      Sensory: No sensory deficit.      Motor: No weakness.      Coordination: Coordination normal.      Gait: Gait normal.      Deep Tendon Reflexes: Reflexes normal.   Psychiatric:         Mood and Affect: Mood normal.         Behavior: Behavior normal. Behavior is cooperative.         Thought Content: Thought content normal.         Judgment: Judgment normal.         Assessment/Plan   Problem List Items Addressed This Visit             ICD-10-CM    Acute gouty arthropathy M10.9     Continues the Allopurinol although compliance is a problem and monitor uric acid levels          Relevant Orders    Arthritis Panel (CMS)    Hypertension I10     HTN - hypertension well/controlled .Target BP < 130/80 achieved. Educate low salt diet and exercise with weight loss. Educate home self monitoring and diary keeping. Educate risks of elevate blood pressure and benefits of prompt treatment. Refill carvedilol and candesartan and amlodipine          CAD (coronary artery disease), native coronary artery - Primary I25.10     CAD-coronary artery disease- Status Post STENT in CX in December 2023 and in 2021 patient has a history of myocardial infarction/stent placed in stenosed coronary arteries. Target LDL should be below 70 milligrams per deciliter. Reviewed EKG which shows no significant changes. Follows with his cardiologist/ Dr. Meadows. He needs to call us with any new symptoms of angina or shortness of breath. Last stress  test was/last coronary catheterization was/. Need to address risk factors BioCore controlling cholesterol blood pressure and diabetes. Educate extensively diet. Patient needs to follow in rehabilitation/mild exercises daily.          Relevant Orders    Comprehensive Metabolic Panel    Hypercholesterolemia E78.00     Hypercholesterolemia - Monitor lipid profile and educate patient upon risks of high cholesterol and targets. Educate diet and change in lifestyle and increase in exercises - Refill:  Repatha and Zetia  in light of his stent in Cx  and educate compliance with medication and diet.            Relevant Orders    Lipid Panel    Lumbar post-laminectomy syndrome M96.1     DDD - Degenerative disc disease of the Lumbo-Sacral (LS)/Cervical (C)  spine. Educate exercises and referred patient to Physical Therapy (PT). Ordered X-Ray's of the LS/C spine. Consider MRI. Radiculopathy in the distribution of L4-L5-S1/C3-C4-C5 nerve roots, needs NSAIDS (Naprosin 500mg BID vs. Arthrotec 75mg BID or Prednisone taper (Medrol dose pack), Flexeril 10 mg qHS, heat application, and pain control with Tylenol            Relevant Orders    Arthritis Panel (CMS)    Benign prostatic hyperplasia without lower urinary tract symptoms N40.0     BPH - Benign PROSTATIC Hypertrophy, + Dysuria/Nocturia, check PSA, prescribe Flomax 0.4mg qD and Avodart 0.5 mg qD         Relevant Orders    Prostate Specific Antigen    Prostate Specific Antigen    Controlled type 2 diabetes mellitus without complication, without long-term current use of insulin (Multi) E11.9     DM - NIDDM . Reviewed with patient / Will check HbA1c and fasting blood sugars. Educate home self monitoring and diary keeping(reviewed with patient home blood sugar levels /diary). Educate extensively low calorie diet and weight loss with exercise. Reviewed BS- diary and Rx. Regimen. [place] renal protection with ARB/ACEI. Educate compliance with diet and Rx. And educate risks and  autcomes.          Benign essential hypertension I10     HTN - hypertension well/controlled .Target BP < 130/80  achieved. Educate low salt diet and exercise with weight loss. Educate home self monitoring and diary keeping. Educate risks of elevate blood pressure and benefits of prompt treatment.  Refill           Other Visit Diagnoses         Codes    Other fatigue     R53.83    Relevant Orders    CBC and Auto Differential    TSH with reflex to Free T4 if abnormal    Rash     R21    Relevant Medications    mometasone (Elocon) 0.1 % ointment            Hypertension I10        HTN - hypertension well/controlled .Target BP < 130/80 achieved. Educate low salt diet and exercise with weight loss. Educate home self monitoring and diary keeping. Educate risks of elevate blood pressure and benefits of prompt treatment. Refill carvedilol and candesartan and amlodipine            Controlled type 2 diabetes mellitus without complication, without long-term current use of insulin (Multi) E11.9       DM - NIDDM  . Reviewed with patient / Will check  HbA1c and fasting blood sugars. Educate home self monitoring and diary keeping(reviewed with patient home blood sugar levels /diary). Educate extensively low calorie diet and weight loss with exercise. Reviewed BS- diary and Rx. Regimen. [place] renal protection with ARB/ACEI.               Educate compliance with diet and Rx. And educate risks and autcomes.                                                CAD (coronary artery disease), native coronary artery I25.10          CAD-coronary artery disease- Status Post STENT in CX in December 2023 and in 2021 patient has a history of myocardial infarction/stent placed in stenosed coronary arteries. Target LDL should be below 70 milligrams per deciliter. Reviewed EKG which shows no significant changes. Follows with his cardiologist/ Dr. Meadows. He needs to call us with any new symptoms of angina or shortness of breath. Last stress test  was/last coronary catheterization was/. Need to address risk factors BioCore controlling cholesterol blood pressure and diabetes. Educate extensively diet. Patient needs to follow in rehabilitation/mild exercises daily.            Relevant Medications      amLODIPine (Norvasc) 10 mg tablet      clopidogrel (Plavix) 75 mg tablet      Lumbar disc disease with radiculopathy M51.16      Hypercholesterolemia E78.00        Hypercholesterolemia - Monitor lipid profile and educate patient upon risks of high cholesterol and targets. Educate diet and change in lifestyle and increase in exercises - Refill:  Atorvastatin   80 mg daily in light of his stent in Cx  and educate compliance with medication and diet.               Asthma J45.909      Relevant Medications      albuterol 90 mcg/actuation inhaler      Insulin resistance E88.819        Educate extensively low carbohydrate diet AND LOW FAT DIET AND increase in exercise activity  and weight loss program and monitor HbA1C and glucose levels and consider Metformin 500 mg BID with meals              Thrombocytopenia (CMS/HCC) D69.6        Monitor CBC and Asymptomatic  now            Relevant Medications      clopidogrel (Plavix) 75 mg tablet                               Benign essential hypertension - Primary I10            HTN - hypertension well/controlled .Target BP < 130/80  achieved. Educate low salt diet and exercise with weight loss. Educate home self monitoring and diary keeping. Educate risks of elevate blood pressure and benefits of prompt treatment.  Refill            CAD (coronary artery disease), native coronary artery I25.10          CAD-coronary artery disease-patient has a history of myocardial infarction/stent placed in stenosed coronary arteries. Target LDL should be below 70 milligrams per deciliter. Reviewed EKG which shows no significant changes. Follows with his cardiologist/ Dr. Meadows. He needs to call us with any new symptoms of angina or shortness  of breath. Last stress test was/last coronary catheterization was/. Need to address risk factors BioCore controlling cholesterol blood pressure and diabetes. Educate extensively diet. Patient needs to follow in rehabilitation/mild exercises daily.              Relevant Medications        aspirin 81 mg EC tablet        Lumbar disc disease with radiculopathy M51.16          DDD - Degenerative disc disease of the Lumbo-Sacral (LS)/Cervical (C)  spine. Educate exercises and referred patient to Physical Therapy (PT). Ordered X-Ray's of the LS/C spine. Consider MRI. Radiculopathy in the distribution of L4-L5-S1/C3-C4-C5 nerve roots, needs NSAIDS (Naprosin 500mg BID vs. Arthrotec 75mg BID or Prednisone taper (Medrol dose pack), Flexeril 10 mg qHS, heat application, and pain control with Tylenol vs. Vicodin 5/325 mg TID.             Hypercholesterolemia E78.00          Hypercholesterolemia - Monitor lipid profile and educate patient upon risks of high cholesterol and targets. Educate diet and change in lifestyle and increase in exercises - Refill:  Atorvastatin   80 mg daily in light of his stent in Cx  and educate compliance with medication and diet.              Controlled type 2 diabetes mellitus without complication, without long-term current use of insulin (CMS/[facility]) E11.9          DM - NIDDM  . Reviewed with patient / Will check  HbA1c and fasting blood sugars. Educate home self monitoring and diary keeping(reviewed with patient home blood sugar levels /diary). Educate extensively low calorie diet and weight loss with exercise. Reviewed BS- diary and Rx. Regimen. [place] renal protection with ARB/ACEI.               Educate compliance with diet and Rx. And educate risks and autcomes.                                                                                          Hypercholesterolemia E78.00           Hypercholesterolemia - Monitor lipid profile and educate patient upon risks of high cholesterol and targets.  Educate diet and change in lifestyle and increase in exercises - Refill:  Atorvastatin   80 mg daily in light of his stent in Cx  and educate compliance with medication and diet.               Controlled type 2 diabetes mellitus without complication, without long-term current use of insulin (CMS/[facility]) E11.9         DM - NIDDM  . Reviewed with patient / Will check  HbA1c and fasting blood sugars. Educate home self monitoring and diary keeping(reviewed with patient home blood sugar levels /diary). Educate extensively low calorie diet and weight loss with exercise. Reviewed BS- diary and Rx. Regimen. [place] renal protection with ARB/ACEI.               Educate compliance with diet and Rx. And educate risks and autcomes.                                                     Myocardial infarction (CMS/[facility]) I21.9         Status Post MI 10 days ago  had chest pains has a history of CAD and had a Stent placed in the 100% blocked coronary Circumflex artery follows with cardiology and start the Atorvastatin   80 mg daily and address risk factors and educate the Patient  - educate tobacco cessation and control blood pressure and cholesterol              Relevant Medications       carvedilol (Coreg) 12.5 mg tablet       nitroglycerin (Nitrostat) 0.4 mg SL tablet       sildenafil (Viagra) 100 mg tablet       ticagrelor (Brilinta) 90 mg tablet       Hospitalization within last 30 days - Primary Z92.89         reviewed the hospital course and reconcile her medications  for CAD and Status Post MI  =- Status Post MI 10 days ago  had chest pains has a history of CAD and had a Stent placed in the 100% blocked coronary Circumflex artery                              Benign essential hypertension I10           HTN - hypertension well/controlled .Target BP < 130/80  achieved. Educate low salt diet and exercise with weight loss. Educate home self monitoring and diary keeping. Educate risks of elevate blood pressure and benefits of prompt  treatment.  Refill              Degenerative disc disease, cervical - Primary M50.30         DDD - Degenerative disc disease of the )/Cervical (C)  spine. Educate exercises and referred patient to Physical Therapy (PT). Ordered X-Ray's of the /C spine. Consider MRI. Radiculopathy in the distribution of C4-C5-C6 nerve roots, needs NSAIDS (Naprosin 500mg BID vs. Arthrotec 75mg BID or Prednisone taper (Medrol dose pack), Flexeril 10 mg qHS, heat application, and pain control with Tylenol 325 mg TID.               Relevant Orders       Referral to Physical Therapy       Rotator cuff tear M75.100         Rotator Cuff  Syndrome - Left  Shoulder. Tender abduction/adduction and internal/external rotation of the R/L shoulder. Recommend MRI of the affected shoulder and Physical Therapy. Consider Orthopedic consult if no improvement. Also Recommend limitation of all activities targeting the overuse of the rotator cuff.              Relevant Orders       Referral to Physical Therapy       Annual physical exam Z00.00         Overall stable and monitor blood pressure and cervical disc disease and left shoulder pain due to trauma at work      Preventive measures - Recommend ASAP : Last  Colonoscopy 2 years (educate patient risks of colon cancer) refer patient to GI specialist. Ophthalmology and retina exam recommend yearly exams refer patient to an Ophthalmologist. BPH - (Benign Prostatic Hypertrophy) refer patient to an Urologist for rectal exam and PSA check.      Refuses vaccinations of all kind           Relevant Orders       Comprehensive Metabolic Panel       CBC and Auto Differential       Acquired hypothyroidism E03.9       Relevant Orders       TSH with reflex to Free T4 if abnormal       Benign prostatic hyperplasia without lower urinary tract symptoms N40.0       Relevant Orders       Prostate Specific Antigen        Other Visit Diagnoses           Codes     Shortness of breath     R06.02     Relevant Medications      albuterol 90 mcg/actuation inhaler     predniSONE (Deltasone) 20 mg tablet     Hypertension, unspecified type     I10     Relevant Medications     allopurinol (Zyloprim) 300 mg tablet     amLODIPine (Norvasc) 10 mg tablet     hydroCHLOROthiazide (HYDRODiuril) 25 mg tablet     Acute gout of foot, unspecified cause, unspecified laterality     M10.9     Relevant Medications     colchicine, gout, 0.6 mg tablet     Cholesterol depletion     E78.6     Relevant Medications     ezetimibe (Zetia) 10 mg tablet     Other Relevant Orders     Lipid Panel                HTN - hypertension well/controlled.Target BP < 130/80 well/not achieved. Educate low salt diet and exercise with weight loss. Educate home self monitoring and diary keeping. Educate risks of elevate blood pressure and benefits of prompt treatment. Refill:      Hypercholesterolemia - Monitor lipid profile and educate patient upon risks of high cholesterol and targets. Educate diet and change in lifestyle and increase in exercises - Refill: and educate compliance with medication and diet.      Erection Disorder - Erection Disorder - Viagra/Sildenafil 100 mg qD PRN vs. Cialis 20 mg PRN vs. Levitra 20 mg PRN. Educate Exercises (walking and Jogging to Improve Pelvic Blood flow and decrease the size of the Prostate ). Also Recommend Vitamin E 400 to 800 IU qD with Meals. Avoids ETOH or ETOH abuse and control tightly the blood sugars and Diabetes to Prevent Neuropathy.      DDD - Degenerative disc disease of the )/Cervical (C) spine. Educate exercises and referred patient to Physical Therapy (PT). Ordered X-Ray's of the /C spine. Consider MRI. Radiculopathy in the distribution of C4-C5-C6 nerve roots, needs NSAIDS (Naprosin 500mg BID vs. Arthrotec 75mg BID or Prednisone taper (Medrol dose pack), Flexeril 10 mg qHS, heat application, and pain control with Tylenol vs. Vicodin 5/325 mg TID.      CAD-coronary artery disease-patient has a history of Coronary artery disease -.  Target LDL should be below 70 milligrams per deciliter. Reviewed angiogram and status post stent. Follows with his cardiologist and needs a follow up heart catheterization. He needs to call us with any new symptoms of angina or shortness of breath. Last coronary catheterization/Calcium scoring was/ in. Need to address risk factors: cholesterol, blood pressure and diabetes. Educate extensively diet. Patient needs to follow in rehabilitation/mild exercises daily.     DDD - Degenerative disc disease of the Lumbo-Sacral (LS)/ spine. Educate exercises and referred patient to Physical Therapy (PT). Ordered X-Ray's of the LS/ spine. Consider MRI.      Radiculopathy in the distribution of L4-L5-S1/ nerve roots, needs NSAIDS (Naprosin 500mg BID vs. Arthrotec 75mg BID or Prednisone taper (Medrol dose pack), Flexeril 10 mg qHS, heat application, and pain control with Tylenol and Naprosyn 500 mg BID. Educate exercises and referred the patient to PT (Physical Therapy). Get X-Ray's.      Right wrsit swelling - due to arthritis vs. strain vs. gout vs. tendonitis - recommend: heat application and Voltaren gel topically and Reviewed with the patient the the X-rays and run a arthritis panel      DDD - Degenerative disc disease of the Lumbo-Sacral (LS)/ spine. Educate exercises and referred patient to Physical Therapy (PT). Ordered X-Ray's of the LS/ spine. Consider MRI.      Radiculopathy in the distribution of L4-L5-S1/ nerve roots, needs NSAIDS (Naprosin 500mg BID vs. Arthrotec 75mg BID or Prednisone taper (Medrol dose pack), Flexeril 10 mg qHS, heat application, and pain control with Tylenol and Naprosyn 500 mg BID. Educate exercises and referred the patient to PT (Physical Therapy). Get X-Ray's.      Chronic gout into the right first MCP joint - with pain and swelling and redness - educate extensively diet and start Allopurinol 300 m TID _ Colchicine 0.6 mg daily and Allopurinol 300 mg daily -= and pain control with Tylenol -       Left shoulder pain and rotator cuff syndrome possibly associated with Degenerative Joint Disease vs. Chronic Arthritis, of the right shoulder. Pain control, and antiinflammatory medications : consider Kenalog injection and start Voltaren gel 1% topically BID, and Tylenol 325 mg TID PRN. Educate exercises and referred the patient to PT (Physical Therapy). Get X-Ray's. improving symptoms and Educate extensively exercises and stretches and consider MRI of the right shoulder before Kenalog injection      Right elbow contusion - and pain -= recommend: X-rays and bracing - still has some discomfort      Erection Disorder - Viagra 100 mg qD PRN vs. Cialis 20 mg PRN vs. Levitra 20 mg PRN. Educate Exercises (walking and Jogging to Improve Pelvic Blood flow and decrease the size of the Prostate ). Also Recommend Vitamin E 400 to 800 IU qD with Meals. Avoids ETOH or ETOH abuse and control tightly the blood sugars and Diabetes to Prevent Neuropathy.                                         Immunizations/Injections      [facility] SARS-CoV-211/3/2021, 10/5/2021  Pneumococcal polysaccharide vaccine, 23-valent, age 2 years and older (PNEUMOVAX 23)8/17/2016               Immunizations/Injections     [facility] SARS-CoV-211/3/2021, 10/5/2021  Pneumococcal polysaccharide vaccine, 23-valent, age 2 years and older (PNEUMOVAX 23)8/17/2016

## 2024-09-12 NOTE — ASSESSMENT & PLAN NOTE
Hypercholesterolemia - Monitor lipid profile and educate patient upon risks of high cholesterol and targets. Educate diet and change in lifestyle and increase in exercises - Refill:  Repatha and Zetia  in light of his stent in Cx  and educate compliance with medication and diet.

## 2024-09-12 NOTE — ASSESSMENT & PLAN NOTE
DDD - Degenerative disc disease of the Lumbo-Sacral (LS)/Cervical (C)  spine. Educate exercises and referred patient to Physical Therapy (PT). Ordered X-Ray's of the LS/C spine. Consider MRI. Radiculopathy in the distribution of L4-L5-S1/C3-C4-C5 nerve roots, needs NSAIDS (Naprosin 500mg BID vs. Arthrotec 75mg BID or Prednisone taper (Medrol dose pack), Flexeril 10 mg qHS, heat application, and pain control with Tylenol

## 2024-09-12 NOTE — ASSESSMENT & PLAN NOTE
DM - NIDDM . Reviewed with patient / Will check HbA1c and fasting blood sugars. Educate home self monitoring and diary keeping(reviewed with patient home blood sugar levels /diary). Educate extensively low calorie diet and weight loss with exercise. Reviewed BS- diary and Rx. Regimen. [place] renal protection with ARB/ACEI. Educate compliance with diet and Rx. And educate risks and autcomes.

## 2024-09-13 LAB
ALBUMIN SERPL BCP-MCNC: 4.4 G/DL (ref 3.4–5)
ALP SERPL-CCNC: 63 U/L (ref 33–120)
ALT SERPL W P-5'-P-CCNC: 18 U/L (ref 10–52)
ANION GAP SERPL CALC-SCNC: 13 MMOL/L (ref 10–20)
AST SERPL W P-5'-P-CCNC: 21 U/L (ref 9–39)
BASOPHILS # BLD AUTO: 0.02 X10*3/UL (ref 0–0.1)
BASOPHILS NFR BLD AUTO: 0.3 %
BILIRUB SERPL-MCNC: 0.3 MG/DL (ref 0–1.2)
BUN SERPL-MCNC: 15 MG/DL (ref 6–23)
CALCIUM SERPL-MCNC: 9.5 MG/DL (ref 8.6–10.6)
CHLORIDE SERPL-SCNC: 107 MMOL/L (ref 98–107)
CHOLEST SERPL-MCNC: 104 MG/DL (ref 0–199)
CHOLESTEROL/HDL RATIO: 2.3
CO2 SERPL-SCNC: 26 MMOL/L (ref 21–32)
CREAT SERPL-MCNC: 1.23 MG/DL (ref 0.5–1.3)
EGFRCR SERPLBLD CKD-EPI 2021: 69 ML/MIN/1.73M*2
EOSINOPHIL # BLD AUTO: 0.46 X10*3/UL (ref 0–0.7)
EOSINOPHIL NFR BLD AUTO: 7.9 %
ERYTHROCYTE [DISTWIDTH] IN BLOOD BY AUTOMATED COUNT: 13 % (ref 11.5–14.5)
ERYTHROCYTE [SEDIMENTATION RATE] IN BLOOD BY WESTERGREN METHOD: 10 MM/H (ref 0–20)
GLUCOSE SERPL-MCNC: 112 MG/DL (ref 74–99)
HCT VFR BLD AUTO: 40.5 % (ref 41–52)
HDLC SERPL-MCNC: 44.7 MG/DL
HGB BLD-MCNC: 13.6 G/DL (ref 13.5–17.5)
IMM GRANULOCYTES # BLD AUTO: 0.01 X10*3/UL (ref 0–0.7)
IMM GRANULOCYTES NFR BLD AUTO: 0.2 % (ref 0–0.9)
LDLC SERPL CALC-MCNC: 37 MG/DL
LYMPHOCYTES # BLD AUTO: 2.62 X10*3/UL (ref 1.2–4.8)
LYMPHOCYTES NFR BLD AUTO: 45.3 %
MCH RBC QN AUTO: 31 PG (ref 26–34)
MCHC RBC AUTO-ENTMCNC: 33.6 G/DL (ref 32–36)
MCV RBC AUTO: 92 FL (ref 80–100)
MONOCYTES # BLD AUTO: 0.51 X10*3/UL (ref 0.1–1)
MONOCYTES NFR BLD AUTO: 8.8 %
NEUTROPHILS # BLD AUTO: 2.17 X10*3/UL (ref 1.2–7.7)
NEUTROPHILS NFR BLD AUTO: 37.5 %
NON HDL CHOLESTEROL: 59 MG/DL (ref 0–149)
NRBC BLD-RTO: 0 /100 WBCS (ref 0–0)
PLATELET # BLD AUTO: 217 X10*3/UL (ref 150–450)
POTASSIUM SERPL-SCNC: 3.8 MMOL/L (ref 3.5–5.3)
PROT SERPL-MCNC: 7.5 G/DL (ref 6.4–8.2)
PSA SERPL-MCNC: 0.52 NG/ML
RBC # BLD AUTO: 4.39 X10*6/UL (ref 4.5–5.9)
RHEUMATOID FACT SER NEPH-ACNC: <10 IU/ML (ref 0–15)
SODIUM SERPL-SCNC: 142 MMOL/L (ref 136–145)
TRIGL SERPL-MCNC: 113 MG/DL (ref 0–149)
TSH SERPL-ACNC: 1.44 MIU/L (ref 0.44–3.98)
URATE SERPL-MCNC: 6.6 MG/DL (ref 4–7.5)
VLDL: 23 MG/DL (ref 0–40)
WBC # BLD AUTO: 5.8 X10*3/UL (ref 4.4–11.3)

## 2024-09-14 LAB — ANA SER QL HEP2 SUBST: NEGATIVE

## 2024-09-30 ENCOUNTER — OFFICE VISIT (OUTPATIENT)
Dept: URGENT CARE | Age: 59
End: 2024-09-30
Payer: COMMERCIAL

## 2024-09-30 VITALS
DIASTOLIC BLOOD PRESSURE: 87 MMHG | TEMPERATURE: 99.4 F | RESPIRATION RATE: 16 BRPM | HEART RATE: 78 BPM | SYSTOLIC BLOOD PRESSURE: 144 MMHG | OXYGEN SATURATION: 96 %

## 2024-09-30 DIAGNOSIS — J02.9 SORE THROAT: Primary | ICD-10-CM

## 2024-09-30 DIAGNOSIS — J02.9 PHARYNGITIS, UNSPECIFIED ETIOLOGY: ICD-10-CM

## 2024-09-30 LAB
POC RAPID STREP: NEGATIVE
POC SARS-COV-2 AG BINAX: NORMAL

## 2024-09-30 PROCEDURE — 4010F ACE/ARB THERAPY RXD/TAKEN: CPT | Performed by: PHYSICIAN ASSISTANT

## 2024-09-30 PROCEDURE — 87811 SARS-COV-2 COVID19 W/OPTIC: CPT | Performed by: PHYSICIAN ASSISTANT

## 2024-09-30 PROCEDURE — 87651 STREP A DNA AMP PROBE: CPT

## 2024-09-30 PROCEDURE — 3079F DIAST BP 80-89 MM HG: CPT | Performed by: PHYSICIAN ASSISTANT

## 2024-09-30 PROCEDURE — 99203 OFFICE O/P NEW LOW 30 MIN: CPT | Performed by: PHYSICIAN ASSISTANT

## 2024-09-30 PROCEDURE — 4004F PT TOBACCO SCREEN RCVD TLK: CPT | Performed by: PHYSICIAN ASSISTANT

## 2024-09-30 PROCEDURE — 3077F SYST BP >= 140 MM HG: CPT | Performed by: PHYSICIAN ASSISTANT

## 2024-09-30 PROCEDURE — 3048F LDL-C <100 MG/DL: CPT | Performed by: PHYSICIAN ASSISTANT

## 2024-09-30 PROCEDURE — 87880 STREP A ASSAY W/OPTIC: CPT | Performed by: PHYSICIAN ASSISTANT

## 2024-09-30 ASSESSMENT — ENCOUNTER SYMPTOMS
SORE THROAT: 1
RHINORRHEA: 1
COUGH: 0
FEVER: 0
CHILLS: 0
SHORTNESS OF BREATH: 0

## 2024-09-30 NOTE — LETTER
September 30, 2024     Patient: Cb Clayton   YOB: 1965   Date of Visit: 9/30/2024       To Whom It May Concern:    It is my medical opinion that Cb Clayton may return to work on 10/1/24 .    If you have any questions or concerns, please don't hesitate to call.         Sincerely,        Amaar Toledo PA-C    CC: No Recipients

## 2024-09-30 NOTE — PATIENT INSTRUCTIONS
"Strep was negative. Your sore throat is most likely caused by a virus. Sometimes these viral illnesses take up to 3 weeks to go away. You may use throat lozenges or Cepacol throat spray along with Tylenol or Motrin for the pain. Gargle with warm salt water. It is not an infection that needs antibiotics at this time. Follow-up with your primary care doctor on an outpatient basis.     -Gargle with warm salt water 3 to 4 times each day. Mix 1/2 teaspoon (2.5 grams) salt with a cup (240 mL) of warm water.  This can help your sore throat.  -If you are not improving or worsening, please return to our clinic or go to the ER for evaluation.     SEEK FURTHER MEDICAL ATTENTION IF:  -Shortness of breath or difficulty breathing  -You are unable to take a deep breath  -You have difficulties swallowing  -You have difficulties closing your mouth due to pain. (\"HOT POTATO MOUTH\")  -You have chest pain   -You have heart palpitations  -You have fever > 102 F despite fever reducing medications   -You are unable to tolerate fluids for 6 hours or more  -You develop swelling and/or pain in your legs   -You feel faint, lightheaded, or dizzy  -Any other concerning symptoms  "

## 2024-09-30 NOTE — PROGRESS NOTES
Subjective   Patient ID: bC Clayton is a 58 y.o. male. They present today with a chief complaint of No chief complaint on file..    History of Present Illness  -sore throat for 1 day  -endorses history of nasal congestion/sneezing  -has history of asthma  -denies fever, chills, cough, ear pain, CP, SOB             Past Medical History  Allergies as of 09/30/2024 - Reviewed 09/30/2024   Allergen Reaction Noted    Abciximab Unknown 06/01/2010    Lisinopril Angioedema, Unknown, and Swelling 06/21/2008    Ephedrine-guaifenesin Unknown 02/01/2024    Shellfish containing products Unknown, Hives, and Itching 07/22/2010       (Not in a hospital admission)       Past Medical History:   Diagnosis Date    History of spinal surgery 02/01/2024    Swelling of upper extremity 02/01/2024       No past surgical history on file.     reports that he has been smoking cigars. He has never used smokeless tobacco. He reports current alcohol use of about 1.0 standard drink of alcohol per week. He reports that he does not use drugs.    Review of Systems  Review of Systems   Constitutional:  Negative for chills and fever.   HENT:  Positive for postnasal drip, rhinorrhea and sore throat.         SEE HPI   Respiratory:  Negative for cough and shortness of breath.    All other systems reviewed and are negative.      Objective    Vitals:    09/30/24 1817   BP: 144/87   BP Location: Right arm   Patient Position: Sitting   BP Cuff Size: Small adult   Pulse: 78   Resp: 16   Temp: 37.4 °C (99.4 °F)   TempSrc: Oral   SpO2: 96%     No LMP for male patient.    Physical Exam  GEN: Alert, cooperative, NAD, Vitals Reviewed.  ENT: Bilateral TMs and canals unremarkable, + sinus congestion present. Pharynx and tonsils hyperemic. No significant swelling.  No exudates present.  NO cervical LAD  RESP: Unlabored, CTAB, no wheezing, rhonchi, or rales  CARDS: RRR      Procedures    Point of Care Test & Imaging Results from this visit  Results for orders  placed or performed in visit on 09/30/24   POCT rapid strep A manually resulted   Result Value Ref Range    POC Rapid Strep Negative Negative   POCT Covid-19 Rapid Antigen   Result Value Ref Range    POC LFORA-COV-2 AG  Presumptive negative test for SARS-CoV-2 (no antigen detected)     Presumptive negative test for SARS-CoV-2 (no antigen detected)      No results found.    Diagnostic study results (if any) were reviewed by Amara Toledo PA-C.    Assessment/Plan   Allergies, medications, history, and pertinent labs/EKGs/Imaging reviewed by Amara Toledo PA-C.     Medical Decision Making  History and physical exam findings consistent with viral pharyngitis without evidence of strep, peritonsillar abscess, deep neck infection, or sepsis.  Strep culture sent, patient to be notified of positive findings.  Encouraged to continue symptomatic and supportive care measures. Advised to follow up with primary care provider or return with any new or worsening symptoms.  Patient verbalized understanding and agreeable with plan.      At time of discharge patient was clinically well-appearing and HDS for outpatient management. The patient and/or family was educated regarding diagnosis, supportive care, OTC and Rx medications. The patient and/or family was given the opportunity to ask questions prior to discharge.  They verbalized understanding of my discussion of the plans for treatment, expected course, indications to return to  or seek further evaluation in ED, and the need for timely follow up as directed.   They were provided with a work/school excuse if requested.      Orders and Diagnoses  Diagnoses and all orders for this visit:  Sore throat  -     POCT rapid strep A manually resulted  -     POCT Covid-19 Rapid Antigen  -     Group A Streptococcus, PCR  Pharyngitis, unspecified etiology      Medical Admin Record      Patient disposition: Home    Electronically signed by Amara Toledo PA-C  6:40 PM

## 2024-10-01 LAB — S PYO DNA THROAT QL NAA+PROBE: NOT DETECTED

## 2024-10-05 DIAGNOSIS — I25.118 CORONARY ARTERY DISEASE OF NATIVE ARTERY OF NATIVE HEART WITH STABLE ANGINA PECTORIS: ICD-10-CM

## 2024-10-05 DIAGNOSIS — R21 RASH: ICD-10-CM

## 2024-10-05 DIAGNOSIS — E78.6 CHOLESTEROL DEPLETION: ICD-10-CM

## 2024-10-05 DIAGNOSIS — I25.10 CORONARY ARTERY DISEASE INVOLVING NATIVE CORONARY ARTERY OF NATIVE HEART, UNSPECIFIED WHETHER ANGINA PRESENT: ICD-10-CM

## 2024-10-07 RX ORDER — MOMETASONE FUROATE 1 MG/G
OINTMENT TOPICAL DAILY
Qty: 45 G | Refills: 0 | Status: SHIPPED | OUTPATIENT
Start: 2024-10-07

## 2024-10-07 RX ORDER — ASPIRIN 81 MG/1
81 TABLET ORAL DAILY
Qty: 90 TABLET | Refills: 0 | Status: SHIPPED | OUTPATIENT
Start: 2024-10-07

## 2024-10-07 RX ORDER — CLOPIDOGREL BISULFATE 75 MG/1
75 TABLET ORAL DAILY
Qty: 90 TABLET | Refills: 0 | Status: SHIPPED | OUTPATIENT
Start: 2024-10-07

## 2024-10-07 RX ORDER — EZETIMIBE 10 MG/1
10 TABLET ORAL DAILY
Qty: 90 TABLET | Refills: 0 | Status: SHIPPED | OUTPATIENT
Start: 2024-10-07

## 2024-10-08 ENCOUNTER — TELEPHONE (OUTPATIENT)
Dept: PRIMARY CARE | Facility: CLINIC | Age: 59
End: 2024-10-08

## 2024-10-09 DIAGNOSIS — I10 PRIMARY HYPERTENSION: ICD-10-CM

## 2024-10-09 RX ORDER — CANDESARTAN 4 MG/1
4 TABLET ORAL DAILY
Qty: 90 TABLET | Refills: 0 | Status: SHIPPED | OUTPATIENT
Start: 2024-10-09

## 2024-10-24 ENCOUNTER — APPOINTMENT (OUTPATIENT)
Dept: PRIMARY CARE | Facility: CLINIC | Age: 59
End: 2024-10-24
Payer: COMMERCIAL

## 2024-11-01 DIAGNOSIS — I25.10 CORONARY ARTERY DISEASE INVOLVING NATIVE CORONARY ARTERY OF NATIVE HEART, UNSPECIFIED WHETHER ANGINA PRESENT: ICD-10-CM

## 2024-11-01 DIAGNOSIS — R06.02 SHORTNESS OF BREATH: ICD-10-CM

## 2024-11-01 RX ORDER — LEVALBUTEROL TARTRATE 45 UG/1
AEROSOL, METERED ORAL
Qty: 15 G | Refills: 2 | Status: SHIPPED | OUTPATIENT
Start: 2024-11-01

## 2024-11-01 RX ORDER — CLOPIDOGREL BISULFATE 75 MG/1
75 TABLET ORAL DAILY
Qty: 90 TABLET | Refills: 0 | Status: SHIPPED | OUTPATIENT
Start: 2024-11-01

## 2024-11-21 ENCOUNTER — APPOINTMENT (OUTPATIENT)
Dept: PRIMARY CARE | Facility: CLINIC | Age: 59
End: 2024-11-21
Payer: COMMERCIAL

## 2024-11-21 VITALS
SYSTOLIC BLOOD PRESSURE: 128 MMHG | DIASTOLIC BLOOD PRESSURE: 80 MMHG | BODY MASS INDEX: 32.78 KG/M2 | HEART RATE: 65 BPM | HEIGHT: 72 IN | RESPIRATION RATE: 20 BRPM | WEIGHT: 242 LBS | OXYGEN SATURATION: 97 %

## 2024-11-21 DIAGNOSIS — E78.00 HYPERCHOLESTEROLEMIA: ICD-10-CM

## 2024-11-21 DIAGNOSIS — M10.9 ACUTE GOUT OF FOOT, UNSPECIFIED CAUSE, UNSPECIFIED LATERALITY: ICD-10-CM

## 2024-11-21 DIAGNOSIS — N40.0 BENIGN PROSTATIC HYPERPLASIA WITHOUT LOWER URINARY TRACT SYMPTOMS: ICD-10-CM

## 2024-11-21 DIAGNOSIS — G47.33 OSA (OBSTRUCTIVE SLEEP APNEA): ICD-10-CM

## 2024-11-21 DIAGNOSIS — E11.9 CONTROLLED TYPE 2 DIABETES MELLITUS WITHOUT COMPLICATION, WITHOUT LONG-TERM CURRENT USE OF INSULIN (MULTI): ICD-10-CM

## 2024-11-21 DIAGNOSIS — I25.10 ATHEROSCLEROSIS OF CORONARY ARTERY OF NATIVE HEART, UNSPECIFIED VESSEL OR LESION TYPE, UNSPECIFIED WHETHER ANGINA PRESENT: Primary | ICD-10-CM

## 2024-11-21 DIAGNOSIS — I10 HYPERTENSION, UNSPECIFIED TYPE: ICD-10-CM

## 2024-11-21 DIAGNOSIS — I10 BENIGN ESSENTIAL HYPERTENSION: ICD-10-CM

## 2024-11-21 PROCEDURE — 3048F LDL-C <100 MG/DL: CPT | Performed by: INTERNAL MEDICINE

## 2024-11-21 PROCEDURE — 3008F BODY MASS INDEX DOCD: CPT | Performed by: INTERNAL MEDICINE

## 2024-11-21 PROCEDURE — 99213 OFFICE O/P EST LOW 20 MIN: CPT | Performed by: INTERNAL MEDICINE

## 2024-11-21 PROCEDURE — 3074F SYST BP LT 130 MM HG: CPT | Performed by: INTERNAL MEDICINE

## 2024-11-21 PROCEDURE — 4010F ACE/ARB THERAPY RXD/TAKEN: CPT | Performed by: INTERNAL MEDICINE

## 2024-11-21 PROCEDURE — 3079F DIAST BP 80-89 MM HG: CPT | Performed by: INTERNAL MEDICINE

## 2024-11-21 PROCEDURE — 4004F PT TOBACCO SCREEN RCVD TLK: CPT | Performed by: INTERNAL MEDICINE

## 2024-11-21 RX ORDER — COLCHICINE 0.6 MG/1
0.6 TABLET ORAL DAILY
Qty: 90 TABLET | Refills: 0 | Status: SHIPPED | OUTPATIENT
Start: 2024-11-21

## 2024-11-21 RX ORDER — ALLOPURINOL 300 MG/1
300 TABLET ORAL DAILY
Qty: 90 TABLET | Refills: 0 | Status: SHIPPED | OUTPATIENT
Start: 2024-11-21

## 2024-11-21 ASSESSMENT — ENCOUNTER SYMPTOMS
GASTROINTESTINAL NEGATIVE: 1
NEUROLOGICAL NEGATIVE: 1
EYES NEGATIVE: 1
MUSCULOSKELETAL NEGATIVE: 1
ALLERGIC/IMMUNOLOGIC NEGATIVE: 1
HEMATOLOGIC/LYMPHATIC NEGATIVE: 1
ENDOCRINE NEGATIVE: 1
PSYCHIATRIC NEGATIVE: 1
CONSTITUTIONAL NEGATIVE: 1
RESPIRATORY NEGATIVE: 1
CARDIOVASCULAR NEGATIVE: 1

## 2024-11-21 NOTE — ASSESSMENT & PLAN NOTE
DM - NIDDM . Reviewed with patient / Will check HbA1c and fasting blood sugars. Educate home self monitoring and diary keeping(reviewed with patient home blood sugar levels /diary). Educate extensively low calorie diet and weight loss with exercise. Reviewed BS- diary and Rx. Regimen. [place] renal protection with ARB/ACEI. Educate compliance with diet and Rx. And educate risks and autcomes.

## 2024-11-21 NOTE — PROGRESS NOTES
Subjective   Patient ID: Cb Clayton is a 58 y.o. male who presents for Annual Exam (cpe).    HPI     Review of Systems   Constitutional: Negative.    HENT: Negative.     Eyes: Negative.    Respiratory: Negative.     Cardiovascular: Negative.    Gastrointestinal: Negative.    Endocrine: Negative.    Musculoskeletal: Negative.    Skin: Negative.    Allergic/Immunologic: Negative.    Neurological: Negative.    Hematological: Negative.    Psychiatric/Behavioral: Negative.     All other systems reviewed and are negative.      Objective   Pulse 65   Resp 20   Ht 1.829 m (6')   Wt 110 kg (242 lb)   SpO2 97%   BMI 32.82 kg/m²   Blood pressure 128/80, pulse 65, resp. rate 20, height 1.829 m (6'), weight 110 kg (242 lb), SpO2 97%.   Physical Exam  Vitals and nursing note reviewed.   Constitutional:       Appearance: Normal appearance.   HENT:      Head: Normocephalic and atraumatic.      Right Ear: Tympanic membrane, ear canal and external ear normal.      Left Ear: Tympanic membrane, ear canal and external ear normal. There is no impacted cerumen.      Nose: Nose normal.      Mouth/Throat:      Mouth: Mucous membranes are moist.      Pharynx: Oropharynx is clear.   Eyes:      Extraocular Movements: Extraocular movements intact.      Conjunctiva/sclera: Conjunctivae normal.      Pupils: Pupils are equal, round, and reactive to light.   Cardiovascular:      Rate and Rhythm: Normal rate and regular rhythm.      Pulses: Normal pulses.      Heart sounds: Normal heart sounds. No murmur heard.  Pulmonary:      Effort: Pulmonary effort is normal. No respiratory distress.      Breath sounds: Normal breath sounds. No stridor. No wheezing, rhonchi or rales.   Chest:      Chest wall: No tenderness.   Abdominal:      General: Abdomen is flat. Bowel sounds are normal. There is no distension.      Palpations: Abdomen is soft. There is no mass.      Tenderness: There is no abdominal tenderness. There is no right CVA tenderness,  left CVA tenderness, guarding or rebound.      Hernia: No hernia is present.   Musculoskeletal:         General: Normal range of motion.      Cervical back: Normal range of motion and neck supple.   Skin:     General: Skin is warm.      Capillary Refill: Capillary refill takes less than 2 seconds.   Neurological:      General: No focal deficit present.      Mental Status: He is alert.      Cranial Nerves: No cranial nerve deficit.      Sensory: No sensory deficit.      Motor: No weakness.      Coordination: Coordination normal.      Gait: Gait normal.      Deep Tendon Reflexes: Reflexes normal.   Psychiatric:         Mood and Affect: Mood normal.         Behavior: Behavior normal. Behavior is cooperative.         Thought Content: Thought content normal.         Judgment: Judgment normal.         Assessment/Plan   Problem List Items Addressed This Visit             ICD-10-CM    Hypertension I10    Relevant Medications    allopurinol (Zyloprim) 300 mg tablet    Atherosclerosis of coronary artery of native heart - Primary I25.10    Relevant Orders    Nuclear Stress Test    Referral to Cardiology    Hypercholesterolemia E78.00     Hypercholesterolemia - Monitor lipid profile and educate patient upon risks of high cholesterol and targets. Educate diet and change in lifestyle and increase in exercises - Refill:  Repatha and Zetia  in light of his stent in Cx  and educate compliance with medication and diet.            Benign prostatic hyperplasia without lower urinary tract symptoms N40.0     BPH - Benign PROSTATIC Hypertrophy, + Dysuria/Nocturia, check PSA, prescribe Flomax 0.4mg qD and Avodart 0.5 mg qD          Controlled type 2 diabetes mellitus without complication, without long-term current use of insulin (Multi) E11.9     DM - NIDDM . Reviewed with patient / Will check HbA1c and fasting blood sugars. Educate home self monitoring and diary keeping(reviewed with patient home blood sugar levels /diary). Educate  extensively low calorie diet and weight loss with exercise. Reviewed BS- diary and Rx. Regimen. [place] renal protection with ARB/ACEI. Educate compliance with diet and Rx. And educate risks and autcomes.          OBIE (obstructive sleep apnea) G47.33     Sleep apnea/nocturnal hypoxia - Not/ Witnessed - The patient is complaining of day-time sleepiness(falling asleep easily/  narcolepsy) while driving or sitting still. Also patient complains of major tiredness/ fatigue, multiple episodes of night time awakenings(unexpalined). Also patient is at high risk for sleep apnea: overweight, small throat opening and enlarged (kissing) tonsils, sedentary lifestyle, sleeping aides. Recommend: Sleep studies: Nocturnal oxymetry, sleep lab. Consider CPAP vs. Oxygen per Nasal Canula at night at 2L/min. for nocturnal hypoxia          Benign essential hypertension I10     HTN - hypertension well/controlled .Target BP < 130/80 achieved. Educate low salt diet and exercise with weight loss. Educate home self monitoring and diary keeping. Educate risks of elevate blood pressure and benefits of prompt treatment.           Other Visit Diagnoses         Codes    Acute gout of foot, unspecified cause, unspecified laterality     M10.9    Relevant Medications    colchicine 0.6 mg tablet                                  Hypertension I10       HTN - hypertension well/controlled .Target BP < 130/80 achieved. Educate low salt diet and exercise with weight loss. Educate home self monitoring and diary keeping. Educate risks of elevate blood pressure and benefits of prompt treatment. Refill carvedilol and candesartan and amlodipine            CAD (coronary artery disease), native coronary artery - Primary I25.10       CAD-coronary artery disease- Status Post STENT in CX in December 2023 and in 2021 patient has a history of myocardial infarction/stent placed in stenosed coronary arteries. Target LDL should be below 70 milligrams per deciliter.  Reviewed EKG which shows no significant changes. Follows with his cardiologist/ Dr. Meadows. He needs to call us with any new symptoms of angina or shortness of breath. Last stress test was/last coronary catheterization was/. Need to address risk factors BioCore controlling cholesterol blood pressure and diabetes. Educate extensively diet. Patient needs to follow in rehabilitation/mild exercises daily.            Relevant Orders     Comprehensive Metabolic Panel     Hypercholesterolemia E78.00       Hypercholesterolemia - Monitor lipid profile and educate patient upon risks of high cholesterol and targets. Educate diet and change in lifestyle and increase in exercises - Refill:  Repatha and Zetia  in light of his stent in Cx  and educate compliance with medication and diet.              Relevant Orders     Lipid Panel     Lumbar post-laminectomy syndrome M96.1       DDD - Degenerative disc disease of the Lumbo-Sacral (LS)/Cervical (C)  spine. Educate exercises and referred patient to Physical Therapy (PT). Ordered X-Ray's of the LS/C spine. Consider MRI. Radiculopathy in the distribution of L4-L5-S1/C3-C4-C5 nerve roots, needs NSAIDS (Naprosin 500mg BID vs. Arthrotec 75mg BID or Prednisone taper (Medrol dose pack), Flexeril 10 mg qHS, heat application, and pain control with Tylenol               Relevant Orders     Arthritis Panel (CMS)     Benign prostatic hyperplasia without lower urinary tract symptoms N40.0       BPH - Benign PROSTATIC Hypertrophy, + Dysuria/Nocturia, check PSA, prescribe Flomax 0.4mg qD and Avodart 0.5 mg qD           Relevant Orders     Prostate Specific Antigen     Prostate Specific Antigen     Controlled type 2 diabetes mellitus without complication, without long-term current use of insulin (Multi) E11.9       DM - NIDDM . Reviewed with patient / Will check HbA1c and fasting blood sugars. Educate home self monitoring and diary keeping(reviewed with patient home blood sugar levels /diary).  Educate extensively low calorie diet and weight loss with exercise. Reviewed BS- diary and Rx. Regimen. [place] renal protection with ARB/ACEI. Educate compliance with diet and Rx. And educate risks and autcomes.            Benign essential hypertension I10       HTN - hypertension well/controlled .Target BP < 130/80  achieved. Educate low salt diet and exercise with weight loss. Educate home self monitoring and diary keeping. Educate risks of elevate blood pressure and benefits of prompt treatment.  Refill             Other Visit Diagnoses           Codes     Other fatigue     R53.83     Relevant Orders     CBC and Auto Differential     TSH with reflex to Free T4 if abnormal     Rash     R21     Relevant Medications     mometasone (Elocon) 0.1 % ointment                      Hypertension I10         HTN - hypertension well/controlled .Target BP < 130/80 achieved. Educate low salt diet and exercise with weight loss. Educate home self monitoring and diary keeping. Educate risks of elevate blood pressure and benefits of prompt treatment. Refill carvedilol and candesartan and amlodipine            Controlled type 2 diabetes mellitus without complication, without long-term current use of insulin (Multi) E11.9       DM - NIDDM  . Reviewed with patient / Will check  HbA1c and fasting blood sugars. Educate home self monitoring and diary keeping(reviewed with patient home blood sugar levels /diary). Educate extensively low calorie diet and weight loss with exercise. Reviewed BS- diary and Rx. Regimen. [place] renal protection with ARB/ACEI.               Educate compliance with diet and Rx. And educate risks and autcomes.                                                        CAD (coronary artery disease), native coronary artery I25.10           CAD-coronary artery disease- Status Post STENT in CX in December 2023 and in 2021 patient has a history of myocardial infarction/stent placed in stenosed coronary arteries.  Target LDL should be below 70 milligrams per deciliter. Reviewed EKG which shows no significant changes. Follows with his cardiologist/ Dr. Meadows. He needs to call us with any new symptoms of angina or shortness of breath. Last stress test was/last coronary catheterization was/. Need to address risk factors BioCore controlling cholesterol blood pressure and diabetes. Educate extensively diet. Patient needs to follow in rehabilitation/mild exercises daily.            Relevant Medications       amLODIPine (Norvasc) 10 mg tablet       clopidogrel (Plavix) 75 mg tablet       Lumbar disc disease with radiculopathy M51.16       Hypercholesterolemia E78.00         Hypercholesterolemia - Monitor lipid profile and educate patient upon risks of high cholesterol and targets. Educate diet and change in lifestyle and increase in exercises - Refill:  Atorvastatin   80 mg daily in light of his stent in Cx  and educate compliance with medication and diet.               Asthma J45.909       Relevant Medications       albuterol 90 mcg/actuation inhaler       Insulin resistance E88.819         Educate extensively low carbohydrate diet AND LOW FAT DIET AND increase in exercise activity  and weight loss program and monitor HbA1C and glucose levels and consider Metformin 500 mg BID with meals              Thrombocytopenia (CMS/HCC) D69.6         Monitor CBC and Asymptomatic  now            Relevant Medications       clopidogrel (Plavix) 75 mg tablet                                       Benign essential hypertension - Primary I10             HTN - hypertension well/controlled .Target BP < 130/80  achieved. Educate low salt diet and exercise with weight loss. Educate home self monitoring and diary keeping. Educate risks of elevate blood pressure and benefits of prompt treatment.  Refill            CAD (coronary artery disease), native coronary artery I25.10           CAD-coronary artery disease-patient has a history of myocardial  infarction/stent placed in stenosed coronary arteries. Target LDL should be below 70 milligrams per deciliter. Reviewed EKG which shows no significant changes. Follows with his cardiologist/ Dr. Meadows. He needs to call us with any new symptoms of angina or shortness of breath. Last stress test was/last coronary catheterization was/. Need to address risk factors BioCore controlling cholesterol blood pressure and diabetes. Educate extensively diet. Patient needs to follow in rehabilitation/mild exercises daily.              Relevant Medications         aspirin 81 mg EC tablet         Lumbar disc disease with radiculopathy M51.16           DDD - Degenerative disc disease of the Lumbo-Sacral (LS)/Cervical (C)  spine. Educate exercises and referred patient to Physical Therapy (PT). Ordered X-Ray's of the LS/C spine. Consider MRI. Radiculopathy in the distribution of L4-L5-S1/C3-C4-C5 nerve roots, needs NSAIDS (Naprosin 500mg BID vs. Arthrotec 75mg BID or Prednisone taper (Medrol dose pack), Flexeril 10 mg qHS, heat application, and pain control with Tylenol vs. Vicodin 5/325 mg TID.             Hypercholesterolemia E78.00           Hypercholesterolemia - Monitor lipid profile and educate patient upon risks of high cholesterol and targets. Educate diet and change in lifestyle and increase in exercises - Refill:  Atorvastatin   80 mg daily in light of his stent in Cx  and educate compliance with medication and diet.              Controlled type 2 diabetes mellitus without complication, without long-term current use of insulin (CMS/[facility]) E11.9           DM - NIDDM  . Reviewed with patient / Will check  HbA1c and fasting blood sugars. Educate home self monitoring and diary keeping(reviewed with patient home blood sugar levels /diary). Educate extensively low calorie diet and weight loss with exercise. Reviewed BS- diary and Rx. Regimen. [place] renal protection with ARB/ACEI.               Educate compliance with diet and  Rx. And educate risks and autcomes.                                                                                                           Hypercholesterolemia E78.00           Hypercholesterolemia - Monitor lipid profile and educate patient upon risks of high cholesterol and targets. Educate diet and change in lifestyle and increase in exercises - Refill:  Atorvastatin   80 mg daily in light of his stent in Cx  and educate compliance with medication and diet.               Controlled type 2 diabetes mellitus without complication, without long-term current use of insulin (CMS/[facility]) E11.9         DM - NIDDM  . Reviewed with patient / Will check  HbA1c and fasting blood sugars. Educate home self monitoring and diary keeping(reviewed with patient home blood sugar levels /diary). Educate extensively low calorie diet and weight loss with exercise. Reviewed BS- diary and Rx. Regimen. [place] renal protection with ARB/ACEI.               Educate compliance with diet and Rx. And educate risks and autcomes.                                                     Myocardial infarction (CMS/[facility]) I21.9         Status Post MI 10 days ago  had chest pains has a history of CAD and had a Stent placed in the 100% blocked coronary Circumflex artery follows with cardiology and start the Atorvastatin   80 mg daily and address risk factors and educate the Patient  - educate tobacco cessation and control blood pressure and cholesterol              Relevant Medications       carvedilol (Coreg) 12.5 mg tablet       nitroglycerin (Nitrostat) 0.4 mg SL tablet       sildenafil (Viagra) 100 mg tablet       ticagrelor (Brilinta) 90 mg tablet       Hospitalization within last 30 days - Primary Z92.89         reviewed the hospital course and reconcile her medications  for CAD and Status Post MI  =- Status Post MI 10 days ago  had chest pains has a history of CAD and had a Stent placed in the 100% blocked coronary Circumflex artery                               Benign essential hypertension I10           HTN - hypertension well/controlled .Target BP < 130/80  achieved. Educate low salt diet and exercise with weight loss. Educate home self monitoring and diary keeping. Educate risks of elevate blood pressure and benefits of prompt treatment.  Refill              Degenerative disc disease, cervical - Primary M50.30         DDD - Degenerative disc disease of the )/Cervical (C)  spine. Educate exercises and referred patient to Physical Therapy (PT). Ordered X-Ray's of the /C spine. Consider MRI. Radiculopathy in the distribution of C4-C5-C6 nerve roots, needs NSAIDS (Naprosin 500mg BID vs. Arthrotec 75mg BID or Prednisone taper (Medrol dose pack), Flexeril 10 mg qHS, heat application, and pain control with Tylenol 325 mg TID.               Relevant Orders       Referral to Physical Therapy       Rotator cuff tear M75.100         Rotator Cuff  Syndrome - Left  Shoulder. Tender abduction/adduction and internal/external rotation of the R/L shoulder. Recommend MRI of the affected shoulder and Physical Therapy. Consider Orthopedic consult if no improvement. Also Recommend limitation of all activities targeting the overuse of the rotator cuff.              Relevant Orders       Referral to Physical Therapy       Annual physical exam Z00.00         Overall stable and monitor blood pressure and cervical disc disease and left shoulder pain due to trauma at work      Preventive measures - Recommend ASAP : Last  Colonoscopy 2 years (educate patient risks of colon cancer) refer patient to GI specialist. Ophthalmology and retina exam recommend yearly exams refer patient to an Ophthalmologist. BPH - (Benign Prostatic Hypertrophy) refer patient to an Urologist for rectal exam and PSA check.      Refuses vaccinations of all kind           Relevant Orders       Comprehensive Metabolic Panel       CBC and Auto Differential       Acquired hypothyroidism E03.9       Relevant Orders        TSH with reflex to Free T4 if abnormal       Benign prostatic hyperplasia without lower urinary tract symptoms N40.0       Relevant Orders       Prostate Specific Antigen        Other Visit Diagnoses           Codes     Shortness of breath     R06.02     Relevant Medications     albuterol 90 mcg/actuation inhaler     predniSONE (Deltasone) 20 mg tablet     Hypertension, unspecified type     I10     Relevant Medications     allopurinol (Zyloprim) 300 mg tablet     amLODIPine (Norvasc) 10 mg tablet     hydroCHLOROthiazide (HYDRODiuril) 25 mg tablet     Acute gout of foot, unspecified cause, unspecified laterality     M10.9     Relevant Medications     colchicine, gout, 0.6 mg tablet     Cholesterol depletion     E78.6     Relevant Medications     ezetimibe (Zetia) 10 mg tablet     Other Relevant Orders     Lipid Panel                HTN - hypertension well/controlled.Target BP < 130/80 well/not achieved. Educate low salt diet and exercise with weight loss. Educate home self monitoring and diary keeping. Educate risks of elevate blood pressure and benefits of prompt treatment. Refill:      Hypercholesterolemia - Monitor lipid profile and educate patient upon risks of high cholesterol and targets. Educate diet and change in lifestyle and increase in exercises - Refill: and educate compliance with medication and diet.      Erection Disorder - Erection Disorder - Viagra/Sildenafil 100 mg qD PRN vs. Cialis 20 mg PRN vs. Levitra 20 mg PRN. Educate Exercises (walking and Jogging to Improve Pelvic Blood flow and decrease the size of the Prostate ). Also Recommend Vitamin E 400 to 800 IU qD with Meals. Avoids ETOH or ETOH abuse and control tightly the blood sugars and Diabetes to Prevent Neuropathy.      DDD - Degenerative disc disease of the )/Cervical (C) spine. Educate exercises and referred patient to Physical Therapy (PT). Ordered X-Ray's of the /C spine. Consider MRI. Radiculopathy in the distribution of C4-C5-C6  nerve roots, needs NSAIDS (Naprosin 500mg BID vs. Arthrotec 75mg BID or Prednisone taper (Medrol dose pack), Flexeril 10 mg qHS, heat application, and pain control with Tylenol vs. Vicodin 5/325 mg TID.      CAD-coronary artery disease-patient has a history of Coronary artery disease -. Target LDL should be below 70 milligrams per deciliter. Reviewed angiogram and status post stent. Follows with his cardiologist and needs a follow up heart catheterization. He needs to call us with any new symptoms of angina or shortness of breath. Last coronary catheterization/Calcium scoring was/ in. Need to address risk factors: cholesterol, blood pressure and diabetes. Educate extensively diet. Patient needs to follow in rehabilitation/mild exercises daily.     DDD - Degenerative disc disease of the Lumbo-Sacral (LS)/ spine. Educate exercises and referred patient to Physical Therapy (PT). Ordered X-Ray's of the LS/ spine. Consider MRI.      Radiculopathy in the distribution of L4-L5-S1/ nerve roots, needs NSAIDS (Naprosin 500mg BID vs. Arthrotec 75mg BID or Prednisone taper (Medrol dose pack), Flexeril 10 mg qHS, heat application, and pain control with Tylenol and Naprosyn 500 mg BID. Educate exercises and referred the patient to PT (Physical Therapy). Get X-Ray's.      Right wrsit swelling - due to arthritis vs. strain vs. gout vs. tendonitis - recommend: heat application and Voltaren gel topically and Reviewed with the patient the the X-rays and run a arthritis panel      DDD - Degenerative disc disease of the Lumbo-Sacral (LS)/ spine. Educate exercises and referred patient to Physical Therapy (PT). Ordered X-Ray's of the LS/ spine. Consider MRI.      Radiculopathy in the distribution of L4-L5-S1/ nerve roots, needs NSAIDS (Naprosin 500mg BID vs. Arthrotec 75mg BID or Prednisone taper (Medrol dose pack), Flexeril 10 mg qHS, heat application, and pain control with Tylenol and Naprosyn 500 mg BID. Educate exercises and referred  the patient to PT (Physical Therapy). Get X-Ray's.      Chronic gout into the right first MCP joint - with pain and swelling and redness - educate extensively diet and start Allopurinol 300 m TID _ Colchicine 0.6 mg daily and Allopurinol 300 mg daily -= and pain control with Tylenol -      Left shoulder pain and rotator cuff syndrome possibly associated with Degenerative Joint Disease vs. Chronic Arthritis, of the right shoulder. Pain control, and antiinflammatory medications : consider Kenalog injection and start Voltaren gel 1% topically BID, and Tylenol 325 mg TID PRN. Educate exercises and referred the patient to PT (Physical Therapy). Get X-Ray's. improving symptoms and Educate extensively exercises and stretches and consider MRI of the right shoulder before Kenalog injection      Right elbow contusion - and pain -= recommend: X-rays and bracing - still has some discomfort      Erection Disorder - Viagra 100 mg qD PRN vs. Cialis 20 mg PRN vs. Levitra 20 mg PRN. Educate Exercises (walking and Jogging to Improve Pelvic Blood flow and decrease the size of the Prostate ). Also Recommend Vitamin E 400 to 800 IU qD with Meals. Avoids ETOH or ETOH abuse and control tightly the blood sugars and Diabetes to Prevent Neuropathy.                                         Immunizations/Injections      [facility] Purple Cap SARS-CoV-211/3/2021, 10/5/2021  Pneumococcal polysaccharide vaccine, 23-valent, age 2 years and older (PNEUMOVAX 23)8/17/2016                Immunizations/Injections      [facility] Purple Cap SARS-CoV-211/3/2021, 10/5/2021  Pneumococcal polysaccharide vaccine, 23-valent, age 2 years and older (PNEUMOVAX 23)8/17/2016

## 2024-11-21 NOTE — ASSESSMENT & PLAN NOTE
>>ASSESSMENT AND PLAN FOR BENIGN ESSENTIAL HYPERTENSION WRITTEN ON 11/21/2024  3:35 PM BY HOWIE WILLIAM MD    HTN - hypertension well/controlled .Target BP < 130/80 achieved. Educate low salt diet and exercise with weight loss. Educate home self monitoring and diary keeping. Educate risks of elevate blood pressure and benefits of prompt treatment.

## 2024-11-26 ENCOUNTER — TELEPHONE (OUTPATIENT)
Dept: PRIMARY CARE | Facility: CLINIC | Age: 59
End: 2024-11-26
Payer: COMMERCIAL

## 2024-11-30 ENCOUNTER — HEALTH MAINTENANCE LETTER (OUTPATIENT)
Age: 59
End: 2024-11-30

## 2024-12-09 ENCOUNTER — APPOINTMENT (OUTPATIENT)
Dept: RADIOLOGY | Facility: HOSPITAL | Age: 59
End: 2024-12-09
Payer: COMMERCIAL

## 2024-12-09 ENCOUNTER — HOSPITAL ENCOUNTER (OUTPATIENT)
Dept: RADIOLOGY | Facility: HOSPITAL | Age: 59
End: 2024-12-09
Payer: COMMERCIAL

## 2024-12-09 ENCOUNTER — APPOINTMENT (OUTPATIENT)
Dept: CARDIOLOGY | Facility: HOSPITAL | Age: 59
End: 2024-12-09
Payer: COMMERCIAL

## 2025-01-05 DIAGNOSIS — I25.118 CORONARY ARTERY DISEASE OF NATIVE ARTERY OF NATIVE HEART WITH STABLE ANGINA PECTORIS: ICD-10-CM

## 2025-01-05 DIAGNOSIS — E78.6 CHOLESTEROL DEPLETION: ICD-10-CM

## 2025-01-06 ENCOUNTER — OFFICE VISIT (OUTPATIENT)
Dept: CARDIOLOGY | Facility: CLINIC | Age: 60
End: 2025-01-06
Payer: COMMERCIAL

## 2025-01-06 VITALS
RESPIRATION RATE: 16 BRPM | OXYGEN SATURATION: 99 % | TEMPERATURE: 96.4 F | BODY MASS INDEX: 32.82 KG/M2 | DIASTOLIC BLOOD PRESSURE: 81 MMHG | SYSTOLIC BLOOD PRESSURE: 134 MMHG | HEART RATE: 66 BPM | HEIGHT: 72 IN

## 2025-01-06 DIAGNOSIS — I25.10 ATHEROSCLEROSIS OF CORONARY ARTERY OF NATIVE HEART, UNSPECIFIED VESSEL OR LESION TYPE, UNSPECIFIED WHETHER ANGINA PRESENT: Primary | ICD-10-CM

## 2025-01-06 DIAGNOSIS — I10 PRIMARY HYPERTENSION: ICD-10-CM

## 2025-01-06 DIAGNOSIS — I10 BENIGN ESSENTIAL HYPERTENSION: ICD-10-CM

## 2025-01-06 DIAGNOSIS — E78.00 HYPERCHOLESTEROLEMIA: ICD-10-CM

## 2025-01-06 PROCEDURE — 3079F DIAST BP 80-89 MM HG: CPT | Performed by: REGISTERED NURSE

## 2025-01-06 PROCEDURE — 93005 ELECTROCARDIOGRAM TRACING: CPT | Performed by: REGISTERED NURSE

## 2025-01-06 PROCEDURE — 99215 OFFICE O/P EST HI 40 MIN: CPT | Performed by: REGISTERED NURSE

## 2025-01-06 PROCEDURE — 3075F SYST BP GE 130 - 139MM HG: CPT | Performed by: REGISTERED NURSE

## 2025-01-06 PROCEDURE — 99417 PROLNG OP E/M EACH 15 MIN: CPT | Performed by: REGISTERED NURSE

## 2025-01-06 PROCEDURE — 99205 OFFICE O/P NEW HI 60 MIN: CPT | Performed by: REGISTERED NURSE

## 2025-01-06 RX ORDER — AMLODIPINE AND OLMESARTAN MEDOXOMIL 10; 40 MG/1; MG/1
1 TABLET ORAL DAILY
Qty: 30 TABLET | Refills: 11 | Status: SHIPPED | OUTPATIENT
Start: 2025-01-06

## 2025-01-06 RX ORDER — EZETIMIBE 10 MG/1
10 TABLET ORAL DAILY
Qty: 90 TABLET | Refills: 0 | Status: SHIPPED | OUTPATIENT
Start: 2025-01-06

## 2025-01-06 RX ORDER — ASPIRIN 81 MG/1
81 TABLET ORAL DAILY
Qty: 90 TABLET | Refills: 0 | Status: SHIPPED | OUTPATIENT
Start: 2025-01-06

## 2025-01-06 ASSESSMENT — ENCOUNTER SYMPTOMS
SHORTNESS OF BREATH: 0
COUGH: 0
DIZZINESS: 0
PND: 0
BRUISES/BLEEDS EASILY: 0
SYNCOPE: 0
IRREGULAR HEARTBEAT: 0
ORTHOPNEA: 0
LIGHT-HEADEDNESS: 0
PALPITATIONS: 0
DYSPNEA ON EXERTION: 0
NEAR-SYNCOPE: 0

## 2025-01-06 ASSESSMENT — PAIN SCALES - GENERAL: PAINLEVEL_OUTOF10: 0-NO PAIN

## 2025-01-06 NOTE — PROGRESS NOTES
Cardiology Clinic Note    Reason for Visit: New Patient Visit (NPV).  Referring Clinician: Timo     History of Present Illness: Cb Clayotn is a 59 y.o. male who presents for New Patient Visit (NPV). His PMHx includes: HTN, HLP, and   CAD- STEMI s/p KP 2.93e37vi, post dilated to 3mm, SHERIF 3 flow, mild ISR of previously present Lcx stent in 12/2023 and inferolateral STEMI s/p PCI with BMS to lateral left circumflex in 2010, AAA (3.1cm). He has not seen outpatient cardiology since last event. His PCP referred him here today. Overall he is doing well, besides some side effects from beta-blocker, fatigue/stress from working at the [employer] and working 16 hour shifts overnight. He has been on DAPT since 12/2023, and per interventional cardiology, only needs DAPT for 1 year. His BP is adequately controlled while on low dose ARB, carvedilol 12.5mg BID, and amlodipine 10mg. His last LDL in 9/2024 was 37, he is on Repatha and ezetimibe, he is statin intolerant. He works out routinely, both weight bearing and aerobic. He is finding it difficult to eat healthy because of long hours. A nuc stress test has been ordered by PCP but he rescheduled.            Past Medical History:  He has a past medical history of History of spinal surgery (02/01/2024) and Swelling of upper extremity (02/01/2024).    Past Surgical History:  He has no past surgical history on file.    Social History:  He reports that he has been smoking cigars. He has never used smokeless tobacco. He reports current alcohol use of about 1.0 standard drink of alcohol per week. He reports that he does not use drugs.    Family History:  No family history on file.    Allergies:  Abciximab, Lisinopril, Ephedrine-guaifenesin, and Shellfish containing products    Outpatient Medications:  Current Outpatient Medications   Medication Instructions    albuterol 90 mcg/actuation inhaler 2 puffs, inhalation, Every 6 hours PRN    allopurinol (ZYLOPRIM)  300 mg, oral, Daily    amlodipine-olmesartan (Lynda) 10-40 mg tablet 1 tablet, oral, Daily    aspirin 81 mg, oral, Daily    colchicine 0.6 mg, oral, Daily    evolocumab (REPATHA) 140 mg, subcutaneous    ezetimibe (ZETIA) 10 mg, oral, Daily    levalbuterol (Xopenex) 45 mcg/actuation inhaler INHALE 1 TO 2 PUFFS BY MOUTH EVERY 4 TO 6 HOURS AS NEEDED    mometasone (Elocon) 0.1 % ointment Topical, Daily, apply to affected area every day    nitroglycerin (NITROSTAT) 0.4 mg, Every 5 min PRN    sildenafil (VIAGRA) 100 mg, As needed       Review of Systems:  Review of Systems   Constitutional: Positive for malaise/fatigue.   Cardiovascular:  Negative for chest pain, cyanosis, dyspnea on exertion, irregular heartbeat, leg swelling, near-syncope, orthopnea, palpitations, paroxysmal nocturnal dyspnea and syncope.   Respiratory:  Negative for cough and shortness of breath.    Hematologic/Lymphatic: Does not bruise/bleed easily.   Neurological:  Negative for dizziness and light-headedness.       Last Recorded Vitals:  Vitals:    01/06/25 0956   BP: 134/81   Pulse: 66   Resp: 16   Temp: 35.8 °C (96.4 °F)   TempSrc: Temporal   SpO2: 99%   Height: 1.829 m (6')       Physical Examination:  GENERAL: NAD  HEENT: no JVD  CV: RRR without m/r/g  PULM: CTAB  EXT: non-edematous bilateral lower extremities    Laboratory Studies:  Lab Results   Component Value Date    GLUCOSE 112 (H) 09/12/2024    CALCIUM 9.5 09/12/2024     09/12/2024    K 3.8 09/12/2024    CO2 26 09/12/2024     09/12/2024    BUN 15 09/12/2024    CREATININE 1.23 09/12/2024     Lab Results   Component Value Date    ALT 18 09/12/2024    AST 21 09/12/2024    ALKPHOS 63 09/12/2024    BILITOT 0.3 09/12/2024         Lab Results   Component Value Date    CHOL 104 09/12/2024    CHOL 210 (H) 10/26/2023    CHOL 220 (H) 07/27/2023     Lab Results   Component Value Date    HDL 44.7 09/12/2024    HDL 40.7 10/26/2023    HDL 43.3 07/27/2023     Lab Results   Component Value Date  "   LDLCALC 37 09/12/2024    LDLCALC 144 (H) 10/26/2023     Lab Results   Component Value Date    TRIG 113 09/12/2024    TRIG 128 10/26/2023    TRIG 81 07/27/2023     No components found for: \"CHOLHDL\"  Lab Results   Component Value Date    HGBA1C 5.8 (H) 11/10/2021     No components found for: \"UACR\"    Cardiology Tests:  ECG:No results found for this or any previous visit from the past 1095 days.      Echo:No results found for this or any previous visit from the past 1095 days.      Cath:No results found for this or any previous visit from the past 1095 days.      Stress Test:No results found for this or any previous visit from the past 1095 days.      Cardiac Imaging:No results found for this or any previous visit from the past 1095 days.      The ASCVD Risk score (Oscar OCHOA, et al., 2019) failed to calculate for the following reasons:    Risk score cannot be calculated because patient has a medical history suggesting prior/existing ASCVD   I personally reviewed EKG, progress notes, and labs.   Assessment and Plan:  Problem List Items Addressed This Visit       Hypertension     Adequate control, goal BP <130/80. Will combine long-acting ARB with CCB, single pill combo therapy as Lynda (olmesartan-amlodipine 40-10mg) , to reduce pill burden. Advised Mr. Clayton to return in 2 weeks for BP check.     Stop candesartan, amlodipine, and carvedilol.          Atherosclerosis of coronary artery of native heart - Primary     STEMI s/p KP 2.54t57xw, post dilated to 3mm, SHERIF 3 flow, mild ISR of previously present Lcx stent in 12/2023  inferolateral STEMI s/p PCI with BMS to lateral left circumflex in 2010    DAPT for 1 year completed, stop Plavix, remain on ASA indefinitely.   Stop carvedilol 12.5mg BID due to ED side effects, lack of anginal symptoms, and no heart failure s/s.  May revisit pending blood pressure control and/or EF  Nuc stress test pending.          Relevant Medications    amlodipine-olmesartan (Lynda) 10-40 " mg tablet    Hypercholesterolemia     Last LDL 37 in September 2024. Statin intolerant, continue ezetimibe and Repatha. Discussed dietary changes           Other Visit Diagnoses       Benign essential hypertension                Follow up in 4-6 weeks, after stress test. BP check in clinic in 2 weeks.     Ha Shirley DNP, APRN-CNP  Clinician,  [facility]  Lead Clinician, [facility]       Alert-The patient is alert, awake and responds to voice. The patient is oriented to time, place, and person. The triage nurse is able to obtain subjective information.

## 2025-01-06 NOTE — ASSESSMENT & PLAN NOTE
STEMI s/p KP 2.05e60dt, post dilated to 3mm, SHERIF 3 flow, mild ISR of previously present Lcx stent in 12/2023  inferolateral STEMI s/p PCI with BMS to lateral left circumflex in 2010    DAPT for 1 year completed, stop Plavix, remain on ASA indefinitely.   Stop carvedilol 12.5mg BID due to ED side effects, lack of anginal symptoms, and no heart failure s/s.  May revisit pending blood pressure control and/or EF  Nuc stress test pending.

## 2025-01-06 NOTE — ASSESSMENT & PLAN NOTE
Last LDL 37 in September 2024. Statin intolerant, continue ezetimibe and Repatha. Discussed dietary changes

## 2025-01-06 NOTE — ASSESSMENT & PLAN NOTE
Adequate control, goal BP <130/80. Will combine long-acting ARB with CCB, single pill combo therapy as Lynda (olmesartan-amlodipine 40-10mg) , to reduce pill burden. Advised Mr. Clayton to return in 2 weeks for BP check.     Stop candesartan, amlodipine, and carvedilol.

## 2025-01-09 ENCOUNTER — HOSPITAL ENCOUNTER (EMERGENCY)
Facility: HOSPITAL | Age: 60
Discharge: HOME | End: 2025-01-09
Attending: EMERGENCY MEDICINE
Payer: COMMERCIAL

## 2025-01-09 VITALS
HEIGHT: 73 IN | TEMPERATURE: 98.2 F | DIASTOLIC BLOOD PRESSURE: 85 MMHG | SYSTOLIC BLOOD PRESSURE: 144 MMHG | WEIGHT: 245 LBS | RESPIRATION RATE: 18 BRPM | HEART RATE: 65 BPM | BODY MASS INDEX: 32.47 KG/M2 | OXYGEN SATURATION: 98 %

## 2025-01-09 DIAGNOSIS — R07.9 CHEST PAIN, UNSPECIFIED TYPE: Primary | ICD-10-CM

## 2025-01-09 LAB
ALBUMIN SERPL BCP-MCNC: 4.2 G/DL (ref 3.4–5)
ALP SERPL-CCNC: 63 U/L (ref 33–120)
ALT SERPL W P-5'-P-CCNC: 18 U/L (ref 10–52)
ANION GAP SERPL CALC-SCNC: 13 MMOL/L (ref 10–20)
AST SERPL W P-5'-P-CCNC: 30 U/L (ref 9–39)
BASOPHILS # BLD AUTO: 0.02 X10*3/UL (ref 0–0.1)
BASOPHILS NFR BLD AUTO: 0.4 %
BILIRUB SERPL-MCNC: 0.7 MG/DL (ref 0–1.2)
BUN SERPL-MCNC: 12 MG/DL (ref 6–23)
CALCIUM SERPL-MCNC: 9.1 MG/DL (ref 8.6–10.6)
CARDIAC TROPONIN I PNL SERPL HS: 8 NG/L (ref 0–53)
CARDIAC TROPONIN I PNL SERPL HS: 9 NG/L (ref 0–53)
CHLORIDE SERPL-SCNC: 106 MMOL/L (ref 98–107)
CO2 SERPL-SCNC: 25 MMOL/L (ref 21–32)
CREAT SERPL-MCNC: 1.04 MG/DL (ref 0.5–1.3)
EGFRCR SERPLBLD CKD-EPI 2021: 84 ML/MIN/1.73M*2
EOSINOPHIL # BLD AUTO: 0.3 X10*3/UL (ref 0–0.7)
EOSINOPHIL NFR BLD AUTO: 5.6 %
ERYTHROCYTE [DISTWIDTH] IN BLOOD BY AUTOMATED COUNT: 13.3 % (ref 11.5–14.5)
GLUCOSE SERPL-MCNC: 94 MG/DL (ref 74–99)
HCT VFR BLD AUTO: 37.2 % (ref 41–52)
HGB BLD-MCNC: 13.2 G/DL (ref 13.5–17.5)
IMM GRANULOCYTES # BLD AUTO: 0.01 X10*3/UL (ref 0–0.7)
IMM GRANULOCYTES NFR BLD AUTO: 0.2 % (ref 0–0.9)
LYMPHOCYTES # BLD AUTO: 2.48 X10*3/UL (ref 1.2–4.8)
LYMPHOCYTES NFR BLD AUTO: 46.4 %
MCH RBC QN AUTO: 31.1 PG (ref 26–34)
MCHC RBC AUTO-ENTMCNC: 35.5 G/DL (ref 32–36)
MCV RBC AUTO: 88 FL (ref 80–100)
MONOCYTES # BLD AUTO: 0.54 X10*3/UL (ref 0.1–1)
MONOCYTES NFR BLD AUTO: 10.1 %
NEUTROPHILS # BLD AUTO: 1.99 X10*3/UL (ref 1.2–7.7)
NEUTROPHILS NFR BLD AUTO: 37.3 %
NRBC BLD-RTO: 0 /100 WBCS (ref 0–0)
PLATELET # BLD AUTO: 189 X10*3/UL (ref 150–450)
POTASSIUM SERPL-SCNC: 4.5 MMOL/L (ref 3.5–5.3)
PROT SERPL-MCNC: 7.7 G/DL (ref 6.4–8.2)
RBC # BLD AUTO: 4.24 X10*6/UL (ref 4.5–5.9)
SODIUM SERPL-SCNC: 139 MMOL/L (ref 136–145)
WBC # BLD AUTO: 5.3 X10*3/UL (ref 4.4–11.3)

## 2025-01-09 PROCEDURE — 80053 COMPREHEN METABOLIC PANEL: CPT | Performed by: EMERGENCY MEDICINE

## 2025-01-09 PROCEDURE — 85025 COMPLETE CBC W/AUTO DIFF WBC: CPT | Performed by: EMERGENCY MEDICINE

## 2025-01-09 PROCEDURE — 99283 EMERGENCY DEPT VISIT LOW MDM: CPT | Performed by: EMERGENCY MEDICINE

## 2025-01-09 PROCEDURE — 36415 COLL VENOUS BLD VENIPUNCTURE: CPT | Performed by: EMERGENCY MEDICINE

## 2025-01-09 PROCEDURE — 84484 ASSAY OF TROPONIN QUANT: CPT | Performed by: EMERGENCY MEDICINE

## 2025-01-09 PROCEDURE — 99285 EMERGENCY DEPT VISIT HI MDM: CPT | Performed by: EMERGENCY MEDICINE

## 2025-01-09 RX ORDER — DIPHENHYDRAMINE HCL 25 MG
25 CAPSULE ORAL ONCE
Status: DISCONTINUED | OUTPATIENT
Start: 2025-01-09 | End: 2025-01-09

## 2025-01-09 ASSESSMENT — COLUMBIA-SUICIDE SEVERITY RATING SCALE - C-SSRS
2. HAVE YOU ACTUALLY HAD ANY THOUGHTS OF KILLING YOURSELF?: NO
6. HAVE YOU EVER DONE ANYTHING, STARTED TO DO ANYTHING, OR PREPARED TO DO ANYTHING TO END YOUR LIFE?: NO
1. IN THE PAST MONTH, HAVE YOU WISHED YOU WERE DEAD OR WISHED YOU COULD GO TO SLEEP AND NOT WAKE UP?: NO

## 2025-01-09 ASSESSMENT — HEART SCORE
AGE: 45-64
TROPONIN: LESS THAN OR EQUAL TO NORMAL LIMIT
ECG: NORMAL
HISTORY: SLIGHTLY SUSPICIOUS
HEART SCORE: 3
RISK FACTORS: >2 RISK FACTORS OR HX OF ATHEROSCLEROTIC DISEASE

## 2025-01-09 ASSESSMENT — PAIN - FUNCTIONAL ASSESSMENT: PAIN_FUNCTIONAL_ASSESSMENT: 0-10

## 2025-01-09 ASSESSMENT — PAIN SCALES - GENERAL: PAINLEVEL_OUTOF10: 1

## 2025-01-09 NOTE — Clinical Note
Cb Clayton was seen and treated in our emergency department on 1/9/2025.  He may return to work on 01/10/2025.       If you have any questions or concerns, please don't hesitate to call.      Shirley Riggs MD

## 2025-01-10 NOTE — DISCHARGE INSTRUCTIONS
Thank you for allowing us to participate in your healthcare.  You were seen in the ED for chest pain earlier in the day.  We did an EKG to look at your heart rhythm and it was normal.  We also did blood tests including cardiac enzyme testing called troponin which was negative.  We recommend you schedule a cardiac stress test that has already been ordered by your cardiologist for further evaluation.

## 2025-01-10 NOTE — ED PROVIDER NOTES
Emergency Department Provider Note        History of Present Illness     History provided by: Patient  Limitations to History: None  External Records Reviewed with Brief Summary: Outpatient progress note from 1/6 cardiology office visit which showed recent changes in medications and cardiac stress test    HPI:  Cb Clayton is a 59 y.o. male with PMH of HTN, HLD, CAD- STEMI s/p KP 2.42t96hn, post dilated to 3mm, SHERIF 3 flow, mild ISR of previously present Lcx stent in 12/2023 and inferolateral STEMI s/p PCI with BMS to lateral left circumflex in 2010, AAA (3.1cm), BPH, asthma, T2DM presenting for 1 episode of chest pain earlier today around 3 PM.  Patient reports he was in his normal state of health talking to a colleague at work when he felt a sharp chest pain so he sat down for 5 to 10 minutes and the pain resolved.  He denies shortness of breath, lightheadedness, URI symptoms, fever, chills, nausea/vomiting, abdominal pain, weakness/numbness/tingling in face/extremities.  Patient reports he went to see a medical professional at his work and was found to have a BP of 157/80s and was asked to come to the ED. currently patient denies any chest pain.  He reports his BP might have been elevated because he was late to take his medications this morning.  He denies having anginal symptoms.  He reports that he recently saw his cardiologist and recommended cardiac stress test and modified his medication regiment.  Patient however did not  his new medications at and has not made any changes to his regiment yet.      Physical Exam   Triage vitals:  T 36.8 °C (98.2 °F)  HR 65  /85  RR 18  O2 98 % None (Room air)    General: Awake, alert, in no acute distress  Eyes: Gaze conjugate.  No scleral icterus or injection  HENT: Normo-cephalic, atraumatic. No stridor  CV: Regular rate, regular rhythm. Radial pulses 2+ bilaterally  Resp: Breathing non-labored, speaking in full sentences.  Clear to auscultation  bilaterally  GI: Soft, non-distended, non-tender. No rebound or guarding.  MSK/Extremities: No gross bony deformities. Moving all extremities  Skin: Warm. Appropriate color  Neuro: Alert. Oriented. Face symmetric. Speech is fluent.  Gross strength and sensation intact in b/l UE and LEs  Psych: Appropriate mood and affect    Medical Decision Making & ED Course   Medical Decision Makin y.o. male with PMH of HTN, HLD, CAD- STEMI s/p KP 2.75x14lx, post dilated to 3mm, SHERIF 3 flow, mild ISR of previously present Lcx stent in 2023 and inferolateral STEMI s/p PCI with BMS to lateral left circumflex in , AAA (3.1cm), BPH, asthma, T2DM presenting for 1 episode of chest pain at work while talking to our colleague that improved after a few minutes of sitting down earlier today in the ED patient with mildly elevated /85 but otherwise hemodynamically stable and well appearing.  He reports chest pain is gone at this time. EKG is NSR.  Workup including CBC, CMP, troponin 0 and 1 negative.  Given heart score of 3 shared decision making with patient to pursue a nuclear stress test outpatient.  ----  Scoring Tools Utilized: HEART Score: 3       Differential diagnoses considered include but are not limited to: Angina, ACS, viral illness     Social Determinants of Health which Significantly Impact Care: None identified     EKG Independent Interpretation: EKG interpreted by myself. Please see ED Course for full interpretation.    Independent Result Review and Interpretation: Relevant laboratory and radiographic results were reviewed and independently interpreted by myself.  As necessary, they are commented on in the ED Course.    Chronic conditions affecting the patient's care: As documented above in [facility]    The patient was discussed with the following consultants/services: None    Care Considerations: As documented above in [facility]    ED Course:  Diagnoses as of 25 2319   Chest pain, unspecified type      Disposition   As a result of the work-up, the patient was discharged home.  he was informed of his diagnosis and instructed to come back with any concerns or worsening of condition.  he and was agreeable to the plan as discussed above.  he was given the opportunity to ask questions.  All of the patient's questions were answered.    Patient seen and discussed with ED attending physician.    Shirley Riggs M.D.  Family Medicine  PGY-2         Shirley Riggs MD  Resident  01/09/25 1857       Shirley Riggs MD  Resident  01/09/25 [id number]

## 2025-02-03 ENCOUNTER — OFFICE VISIT (OUTPATIENT)
Dept: CARDIOLOGY | Facility: CLINIC | Age: 60
End: 2025-02-03
Payer: COMMERCIAL

## 2025-02-03 VITALS
HEIGHT: 73 IN | WEIGHT: 245.2 LBS | TEMPERATURE: 97.7 F | RESPIRATION RATE: 16 BRPM | DIASTOLIC BLOOD PRESSURE: 83 MMHG | SYSTOLIC BLOOD PRESSURE: 144 MMHG | BODY MASS INDEX: 32.5 KG/M2 | OXYGEN SATURATION: 98 % | HEART RATE: 63 BPM

## 2025-02-03 DIAGNOSIS — I25.118 ATHEROSCLEROSIS OF NATIVE CORONARY ARTERY OF NATIVE HEART WITH STABLE ANGINA PECTORIS: ICD-10-CM

## 2025-02-03 DIAGNOSIS — I10 PRIMARY HYPERTENSION: Primary | ICD-10-CM

## 2025-02-03 PROCEDURE — 3077F SYST BP >= 140 MM HG: CPT | Performed by: REGISTERED NURSE

## 2025-02-03 PROCEDURE — 99213 OFFICE O/P EST LOW 20 MIN: CPT | Performed by: REGISTERED NURSE

## 2025-02-03 PROCEDURE — 3008F BODY MASS INDEX DOCD: CPT | Performed by: REGISTERED NURSE

## 2025-02-03 PROCEDURE — 3079F DIAST BP 80-89 MM HG: CPT | Performed by: REGISTERED NURSE

## 2025-02-03 RX ORDER — ACETAMINOPHEN 500 MG
1 TABLET ORAL DAILY
Qty: 1 KIT | Refills: 0 | Status: SHIPPED | OUTPATIENT
Start: 2025-02-03

## 2025-02-03 SDOH — ECONOMIC STABILITY: FOOD INSECURITY: WITHIN THE PAST 12 MONTHS, YOU WORRIED THAT YOUR FOOD WOULD RUN OUT BEFORE YOU GOT MONEY TO BUY MORE.: NEVER TRUE

## 2025-02-03 SDOH — ECONOMIC STABILITY: FOOD INSECURITY: WITHIN THE PAST 12 MONTHS, THE FOOD YOU BOUGHT JUST DIDN'T LAST AND YOU DIDN'T HAVE MONEY TO GET MORE.: NEVER TRUE

## 2025-02-03 ASSESSMENT — PAIN SCALES - GENERAL: PAINLEVEL_OUTOF10: 4

## 2025-02-03 ASSESSMENT — PATIENT HEALTH QUESTIONNAIRE - PHQ9
2. FEELING DOWN, DEPRESSED OR HOPELESS: NOT AT ALL
SUM OF ALL RESPONSES TO PHQ9 QUESTIONS 1 & 2: 0
1. LITTLE INTEREST OR PLEASURE IN DOING THINGS: NOT AT ALL

## 2025-02-03 ASSESSMENT — ENCOUNTER SYMPTOMS
OCCASIONAL FEELINGS OF UNSTEADINESS: 0
DEPRESSION: 0
NEAR-SYNCOPE: 0
ORTHOPNEA: 0
IRREGULAR HEARTBEAT: 0
SYNCOPE: 0
LOSS OF SENSATION IN FEET: 0
CLAUDICATION: 0
DYSPNEA ON EXERTION: 0
COUGH: 0
DIZZINESS: 0
PND: 0
LIGHT-HEADEDNESS: 0
PALPITATIONS: 0
SHORTNESS OF BREATH: 0
BRUISES/BLEEDS EASILY: 0

## 2025-02-03 ASSESSMENT — LIFESTYLE VARIABLES: HOW OFTEN DO YOU HAVE A DRINK CONTAINING ALCOHOL: MONTHLY OR LESS

## 2025-02-03 NOTE — PROGRESS NOTES
Cardiology Clinic Note    Reason for Visit: Follow-up (4-6 week fuv).  Referring Clinician: No ref. provider found     History of Present Illness: Cb Clayton is a 59 y.o. male who presents for Follow-up (4-6 week fuv). His PMHx includes: HTN, HLP, and   CAD- STEMI s/p KP 2.60h57kk, post dilated to 3mm, SHERIF 3 flow, mild ISR of previously present Lcx stent in 12/2023 and inferolateral STEMI s/p PCI with BMS to lateral left circumflex in 2010, AAA (3.1cm). He has not seen outpatient cardiology since last event. His PCP referred him here today. Overall he is doing well, besides some side effects from beta-blocker, fatigue/stress from working at the [employer] and working 16 hour shifts overnight. He has been on DAPT since 12/2023, and per interventional cardiology, only needs DAPT for 1 year. His BP not controlled in office today. Last visit, started on amlodipine-olmesartan 10-40mg, stopped carvedilol. He has not slept yet since getting off work, under a lot of stress from work/overtime. His last LDL in 9/2024 was 37, he is on Repatha and ezetimibe, he is statin intolerant. He works out routinely, both weight bearing and aerobic.     His stress test is scheduled for February 10th        Past Medical History:  He has a past medical history of History of spinal surgery (02/01/2024) and Swelling of upper extremity (02/01/2024).    Past Surgical History:  He has no past surgical history on file.    Social History:  He reports that he has quit smoking. His smoking use included cigars. He has never used smokeless tobacco. He reports current alcohol use of about 1.0 standard drink of alcohol per week. He reports that he does not use drugs.    Family History:  No family history on file.    Allergies:  Abciximab, Lisinopril, Ephedrine-guaifenesin, and Shellfish containing products    Outpatient Medications:  Current Outpatient Medications   Medication Instructions    albuterol 90 mcg/actuation inhaler 2  "puffs, inhalation, Every 6 hours PRN    allopurinol (ZYLOPRIM) 300 mg, oral, Daily    amlodipine-olmesartan (Lynda) 10-40 mg tablet 1 tablet, oral, Daily    aspirin 81 mg, oral, Daily    blood pressure monitor kit 1 kit, miscellaneous, Daily    colchicine 0.6 mg, oral, Daily    evolocumab (REPATHA) 140 mg, subcutaneous    ezetimibe (ZETIA) 10 mg, oral, Daily    levalbuterol (Xopenex) 45 mcg/actuation inhaler INHALE 1 TO 2 PUFFS BY MOUTH EVERY 4 TO 6 HOURS AS NEEDED    mometasone (Elocon) 0.1 % ointment Topical, Daily, apply to affected area every day    nitroglycerin (NITROSTAT) 0.4 mg, Every 5 min PRN    sildenafil (VIAGRA) 100 mg, As needed       Review of Systems:  Review of Systems   Constitutional: Positive for malaise/fatigue.   Cardiovascular:  Negative for chest pain, claudication, cyanosis, dyspnea on exertion, irregular heartbeat, leg swelling, near-syncope, orthopnea, palpitations, paroxysmal nocturnal dyspnea and syncope.   Respiratory:  Negative for cough and shortness of breath.    Hematologic/Lymphatic: Does not bruise/bleed easily.   Neurological:  Negative for dizziness and light-headedness.       Last Recorded Vitals:  Vitals:    02/03/25 0951   BP: 144/83   BP Location: Left arm   Patient Position: Sitting   BP Cuff Size: Adult   Pulse: 63   Resp: 16   Temp: 36.5 °C (97.7 °F)   TempSrc: Skin   SpO2: 98%   Weight: 111 kg (245 lb 3.2 oz)   Height: 1.854 m (6' 1\")       Physical Examination:  GENERAL: NAD  HEENT: no JVD  CV: RRR without m/r/g  PULM: CTAB  EXT: non-edematous bilateral lower extremities    Laboratory Studies:  Lab Results   Component Value Date    GLUCOSE 94 01/09/2025    CALCIUM 9.1 01/09/2025     01/09/2025    K 4.5 01/09/2025    CO2 25 01/09/2025     01/09/2025    BUN 12 01/09/2025    CREATININE 1.04 01/09/2025     Lab Results   Component Value Date    ALT 18 01/09/2025    AST 30 01/09/2025    ALKPHOS 63 01/09/2025    BILITOT 0.7 01/09/2025         Lab Results   Component " "Value Date    CHOL 104 09/12/2024    CHOL 210 (H) 10/26/2023    CHOL 220 (H) 07/27/2023     Lab Results   Component Value Date    HDL 44.7 09/12/2024    HDL 40.7 10/26/2023    HDL 43.3 07/27/2023     Lab Results   Component Value Date    LDLCALC 37 09/12/2024    LDLCALC 144 (H) 10/26/2023     Lab Results   Component Value Date    TRIG 113 09/12/2024    TRIG 128 10/26/2023    TRIG 81 07/27/2023     No components found for: \"CHOLHDL\"  Lab Results   Component Value Date    HGBA1C 5.8 (H) 11/10/2021     No components found for: \"UACR\"    Cardiology Tests:  ECG:No results found for this or any previous visit from the past 1095 days.      Echo:No results found for this or any previous visit from the past 1095 days.      Cath:No results found for this or any previous visit from the past 1095 days.      Stress Test:No results found for this or any previous visit from the past 1095 days.      Cardiac Imaging:No results found for this or any previous visit from the past 1095 days.      The ASCVD Risk score (Oscar DK, et al., 2019) failed to calculate for the following reasons:    Risk score cannot be calculated because patient has a medical history suggesting prior/existing ASCVD   I personally reviewed EKG, progress notes, and labs.   Assessment and Plan:  Problem List Items Addressed This Visit       Hypertension - Primary     Goal BP<130/80. Encouraged to get Omron BP cuff and monitor home blood pressures. Consider adding hydrochlorothiazide if not within goal. Continue Lynda.          Relevant Medications    blood pressure monitor kit    Other Relevant Orders    Albumin-Creatinine Ratio, Urine Random    Atherosclerosis of coronary artery of native heart     Clinically stable, remain on ASA, lipid lowering agents, and antihypertensives.             Will call with stress test results and review home blood pressures, consider adding hydrochlorothiazide if BP not <130/80.     Ha Shirley, HANANE, APRN-CNP  Clinician, [facility] " Program  Lead Clinician, [facility]

## 2025-02-03 NOTE — ASSESSMENT & PLAN NOTE
Goal BP<130/80. Encouraged to get Omron BP cuff and monitor home blood pressures. Consider adding hydrochlorothiazide if not within goal. Continue Lynda.

## 2025-02-04 LAB
ALBUMIN/CREAT UR: 19 MG/G CREAT
CREAT UR-MCNC: 104 MG/DL (ref 20–320)
MICROALBUMIN UR-MCNC: 2 MG/DL

## 2025-02-10 ENCOUNTER — APPOINTMENT (OUTPATIENT)
Dept: RADIOLOGY | Facility: HOSPITAL | Age: 60
End: 2025-02-10
Payer: COMMERCIAL

## 2025-02-11 NOTE — PROCEDURE: COUNSELING
Detail Level: Detailed
Detail Level: Zone
What Is The Reason For Today's Visit?: Skin Cancer Exam
What Is The Reason For Today's Visit? (Being Monitored For X): the development of new lesions
How Severe Are Your Spot(S)?: moderate
Additional History: Has several scaly brown spots that she wants off.\\Temo wants something for toenail fungus.

## 2025-02-15 DIAGNOSIS — M10.9 ACUTE GOUT OF FOOT, UNSPECIFIED CAUSE, UNSPECIFIED LATERALITY: ICD-10-CM

## 2025-02-17 RX ORDER — COLCHICINE 0.6 MG/1
0.6 TABLET ORAL DAILY
Qty: 90 TABLET | Refills: 0 | Status: SHIPPED | OUTPATIENT
Start: 2025-02-17

## 2025-02-20 ENCOUNTER — APPOINTMENT (OUTPATIENT)
Dept: PRIMARY CARE | Facility: CLINIC | Age: 60
End: 2025-02-20
Payer: COMMERCIAL

## 2025-02-20 VITALS
HEART RATE: 74 BPM | SYSTOLIC BLOOD PRESSURE: 140 MMHG | OXYGEN SATURATION: 96 % | BODY MASS INDEX: 32.6 KG/M2 | WEIGHT: 246 LBS | RESPIRATION RATE: 22 BRPM | DIASTOLIC BLOOD PRESSURE: 80 MMHG | HEIGHT: 73 IN

## 2025-02-20 DIAGNOSIS — D69.6 THROMBOCYTOPENIA (CMS-HCC): ICD-10-CM

## 2025-02-20 DIAGNOSIS — E11.9 CONTROLLED TYPE 2 DIABETES MELLITUS WITHOUT COMPLICATION, WITHOUT LONG-TERM CURRENT USE OF INSULIN (MULTI): ICD-10-CM

## 2025-02-20 DIAGNOSIS — J45.41 MODERATE PERSISTENT ASTHMA WITH ACUTE EXACERBATION (HHS-HCC): Primary | ICD-10-CM

## 2025-02-20 DIAGNOSIS — E03.9 ACQUIRED HYPOTHYROIDISM: ICD-10-CM

## 2025-02-20 DIAGNOSIS — I10 PRIMARY HYPERTENSION: ICD-10-CM

## 2025-02-20 DIAGNOSIS — N40.0 BENIGN PROSTATIC HYPERPLASIA WITHOUT LOWER URINARY TRACT SYMPTOMS: ICD-10-CM

## 2025-02-20 DIAGNOSIS — F17.201 TOBACCO ABUSE, IN REMISSION: ICD-10-CM

## 2025-02-20 DIAGNOSIS — D53.1 MEGALOBLASTIC ANEMIA: ICD-10-CM

## 2025-02-20 DIAGNOSIS — E78.00 HYPERCHOLESTEROLEMIA: ICD-10-CM

## 2025-02-20 DIAGNOSIS — M51.26 DISPLACEMENT OF LUMBAR INTERVERTEBRAL DISC WITHOUT MYELOPATHY: ICD-10-CM

## 2025-02-20 DIAGNOSIS — G47.33 OSA (OBSTRUCTIVE SLEEP APNEA): ICD-10-CM

## 2025-02-20 DIAGNOSIS — I10 HYPERTENSION, UNSPECIFIED TYPE: ICD-10-CM

## 2025-02-20 DIAGNOSIS — M96.1 LUMBAR POST-LAMINECTOMY SYNDROME: ICD-10-CM

## 2025-02-20 PROBLEM — M54.50 LOW BACK PAIN, UNSPECIFIED: Status: RESOLVED | Noted: 2024-08-21 | Resolved: 2025-02-20

## 2025-02-20 PROBLEM — S80.11XA CONTUSION OF MULTIPLE SITES OF RIGHT LEG: Status: RESOLVED | Noted: 2025-01-13 | Resolved: 2025-02-20

## 2025-02-20 PROBLEM — S86.911A STRAIN OF RIGHT KNEE AND LEG: Status: RESOLVED | Noted: 2025-01-13 | Resolved: 2025-02-20

## 2025-02-20 PROBLEM — S93.401A SPRAIN OF RIGHT ANKLE: Status: RESOLVED | Noted: 2025-01-13 | Resolved: 2025-02-20

## 2025-02-20 PROCEDURE — 99214 OFFICE O/P EST MOD 30 MIN: CPT | Performed by: INTERNAL MEDICINE

## 2025-02-20 PROCEDURE — 3079F DIAST BP 80-89 MM HG: CPT | Performed by: INTERNAL MEDICINE

## 2025-02-20 PROCEDURE — 3077F SYST BP >= 140 MM HG: CPT | Performed by: INTERNAL MEDICINE

## 2025-02-20 PROCEDURE — 3008F BODY MASS INDEX DOCD: CPT | Performed by: INTERNAL MEDICINE

## 2025-02-20 RX ORDER — ALLOPURINOL 300 MG/1
300 TABLET ORAL DAILY
Qty: 90 TABLET | Refills: 0 | Status: SHIPPED | OUTPATIENT
Start: 2025-02-20

## 2025-02-20 ASSESSMENT — ENCOUNTER SYMPTOMS
SWEATS: 0
HYPERTENSION: 1
GASTROINTESTINAL NEGATIVE: 1
PND: 0
PALPITATIONS: 0
NECK PAIN: 0
ORTHOPNEA: 1
PSYCHIATRIC NEGATIVE: 1
SHORTNESS OF BREATH: 1
HEMATOLOGIC/LYMPHATIC NEGATIVE: 1
BLURRED VISION: 0
ENDOCRINE NEGATIVE: 1
ALLERGIC/IMMUNOLOGIC NEGATIVE: 1
MUSCULOSKELETAL NEGATIVE: 1
CONSTITUTIONAL NEGATIVE: 1
EYES NEGATIVE: 1
HEADACHES: 0
NEUROLOGICAL NEGATIVE: 1

## 2025-02-20 NOTE — ASSESSMENT & PLAN NOTE
DM - NIDDM . Reviewed with patient / Will check HbA1c and fasting blood sugars. Educate home self monitoring and diary keeping(reviewed with patient home blood sugar levels /diary). Educate extensively low calorie diet and weight loss with exercise. Reviewed BS- diary and Rx. Regimen. McClure renal protection with ARB/ACEI. Educate compliance with diet and Rx. And educate risks and autcomes

## 2025-02-20 NOTE — ASSESSMENT & PLAN NOTE
BPH - Benign PROSTATIC Hypertrophy, + Dysuria/Nocturia, check PSA, Educate exercises and Change in life style, prescribe vitamin E 400 IU qD. Consider referral to urology.

## 2025-02-20 NOTE — LETTER
February 20, 2025     Patient: Cb Clayton   YOB: 1965   Date of Visit: 2/20/2025       To Whom It May Concern:    Cb Clayton was seen in my clinic on 2/20/2025 at 10:45 am. Please excuse Cb for his absence from work on this day to make the appointment.    If you have any questions or concerns, please don't hesitate to call.         Sincerely,         John Greenwood MD        CC: No Recipients

## 2025-02-20 NOTE — ASSESSMENT & PLAN NOTE
Sleep apnea/nocturnal hypoxia - Not/ Witnessed - The patient is complaining of day-time sleepiness(falling asleep easily/ narcolepsy) while driving or sitting still. Also patient complains of major tiredness/ fatigue, multiple episodes of night time awakenings(unexpalined). Also patient is at high risk for sleep apnea: overweight, small throat opening and enlarged (kissing) tonsils, sedentary lifestyle, sleeping aides. Recommend: Sleep studies: Nocturnal oxymetry, sleep lab. Consider CPAP vs. Oxygen per Nasal Canula at night at 2L/min. for nocturnal hypoxia

## 2025-02-20 NOTE — ASSESSMENT & PLAN NOTE
HTN - hypertension well/controlled .Target BP < 130/80  achieved. Educate low salt diet and exercise with weight loss. Educate home self monitoring and diary keeping. Educate risks of elevate blood pressure and benefits of prompt treatment.  Refill Lynda

## 2025-02-20 NOTE — ASSESSMENT & PLAN NOTE
Hypercholesterolemia - Monitor lipid profile and educate patient upon risks of high cholesterol and targets. Educate diet and change in lifestyle and increase in exercises - Refill:   and educate compliance with medication and diet.  Last LDL 37 in September 2024. Statin intolerant, continue ezetimibe and Repatha. Discussed dietary changes

## 2025-02-20 NOTE — PROGRESS NOTES
"Subjective   Patient ID: Cb Clayton is a 59 y.o. male who presents for Follow-up (3 month follow up).    Hypertension  This is a chronic problem. The current episode started more than 1 year ago. The problem has been gradually improving since onset. The problem is controlled. Associated symptoms include orthopnea and shortness of breath. Pertinent negatives include no anxiety, blurred vision, chest pain, headaches, malaise/fatigue, neck pain, palpitations, peripheral edema, PND or sweats. There are no associated agents to hypertension. Risk factors for coronary artery disease include diabetes mellitus, dyslipidemia, family history and stress. Compliance problems include diet, exercise and medication side effects.         Review of Systems   Constitutional: Negative.  Negative for malaise/fatigue.   HENT: Negative.     Eyes: Negative.  Negative for blurred vision.   Respiratory:  Positive for shortness of breath.    Cardiovascular:  Positive for orthopnea. Negative for chest pain, palpitations and PND.   Gastrointestinal: Negative.    Endocrine: Negative.    Musculoskeletal: Negative.  Negative for neck pain.   Skin: Negative.    Allergic/Immunologic: Negative.    Neurological: Negative.  Negative for headaches.   Hematological: Negative.    Psychiatric/Behavioral: Negative.     All other systems reviewed and are negative.      Objective   /80   Pulse 74   Resp 22   Ht 1.854 m (6' 1\")   Wt 112 kg (246 lb)   SpO2 96%   BMI 32.46 kg/m²     Physical Exam  Vitals and nursing note reviewed.   Constitutional:       Appearance: Normal appearance.   HENT:      Head: Normocephalic and atraumatic.      Right Ear: Tympanic membrane, ear canal and external ear normal.      Left Ear: Tympanic membrane, ear canal and external ear normal. There is no impacted cerumen.      Nose: Nose normal.      Mouth/Throat:      Mouth: Mucous membranes are moist.      Pharynx: Oropharynx is clear.   Eyes:      Extraocular " Movements: Extraocular movements intact.      Conjunctiva/sclera: Conjunctivae normal.      Pupils: Pupils are equal, round, and reactive to light.   Cardiovascular:      Rate and Rhythm: Normal rate and regular rhythm.      Pulses: Normal pulses.      Heart sounds: Normal heart sounds. No murmur heard.  Pulmonary:      Effort: Pulmonary effort is normal. No respiratory distress.      Breath sounds: Normal breath sounds. No stridor. No wheezing, rhonchi or rales.   Chest:      Chest wall: No tenderness.   Abdominal:      General: Abdomen is flat. Bowel sounds are normal. There is no distension.      Palpations: Abdomen is soft. There is no mass.      Tenderness: There is no abdominal tenderness. There is no right CVA tenderness, left CVA tenderness, guarding or rebound.      Hernia: No hernia is present.   Musculoskeletal:         General: Normal range of motion.      Cervical back: Normal range of motion and neck supple.   Skin:     General: Skin is warm.      Capillary Refill: Capillary refill takes less than 2 seconds.   Neurological:      General: No focal deficit present.      Mental Status: He is alert.      Cranial Nerves: No cranial nerve deficit.      Sensory: No sensory deficit.      Motor: No weakness.      Coordination: Coordination normal.      Gait: Gait normal.      Deep Tendon Reflexes: Reflexes normal.   Psychiatric:         Mood and Affect: Mood normal.         Behavior: Behavior normal. Behavior is cooperative.         Thought Content: Thought content normal.         Judgment: Judgment normal.         Assessment/Plan   Problem List Items Addressed This Visit             ICD-10-CM    Hypertension I10     HTN - hypertension well/controlled .Target BP < 130/80  achieved. Educate low salt diet and exercise with weight loss. Educate home self monitoring and diary keeping. Educate risks of elevate blood pressure and benefits of prompt treatment.  Refill Lynda         Hypercholesterolemia E78.00      Hypercholesterolemia - Monitor lipid profile and educate patient upon risks of high cholesterol and targets. Educate diet and change in lifestyle and increase in exercises - Refill:   and educate compliance with medication and diet.  Last LDL 37 in September 2024. Statin intolerant, continue ezetimibe and Repatha. Discussed dietary changes          Lumbar post-laminectomy syndrome M96.1     DDD - Degenerative disc disease of the Lumbo-Sacral (LS)/Cervical (C) spine. Educate exercises and referred patient to Physical Therapy (PT). Ordered X-Ray's of the LS/C spine. Consider MRI. Radiculopathy in the distribution of L4-L5-S1/C3-C4-C5 nerve roots, needs NSAIDS (Naprosin 500mg BID vs. Arthrotec 75mg BID or Prednisone taper (Medrol dose pack), Flexeril 10 mg qHS, heat application, and pain control with Tylenol          Acquired hypothyroidism E03.9     Hypothyroidism - Symptoms well/not controlled (weight gain, fatigue, constipation, cold intolerance). Check TSH continue/change dose of Synthroid/Levothyroxine  of  mcg/qD.          Relevant Orders    CBC and Auto Differential    Benign prostatic hyperplasia without lower urinary tract symptoms N40.0     BPH - Benign PROSTATIC Hypertrophy, + Dysuria/Nocturia, check PSA, Educate exercises and Change in life style, prescribe vitamin E 400 IU qD. Consider referral to urology.          Controlled type 2 diabetes mellitus without complication, without long-term current use of insulin (Multi) E11.9     DM - NIDDM . Reviewed with patient / Will check HbA1c and fasting blood sugars. Educate home self monitoring and diary keeping(reviewed with patient home blood sugar levels /diary). Educate extensively low calorie diet and weight loss with exercise. Reviewed BS- diary and Rx. Regimen. [place] renal protection with ARB/ACEI. Educate compliance with diet and Rx. And educate risks and autcomes          Moderate persistent asthma - Primary J45.40     Due to acute bronchitis and  subsequenbt cough   Bronchitis with wheezing                                           Recommend:                                                                                                      mild  Pharyngitis,                                                                                                                                                                               Start-                                                                                                                                                             Asthma/COPD exacerbation       Allegra 180mg qD vs. Claritin 10 mg qD and Mucinex                                                                                                                                              Cough  - start Sinusitis - allergic vs. infectious - start Allegra and Flonase I BID and . and avoid cold exposure.           OBIE (obstructive sleep apnea) G47.33     Sleep apnea/nocturnal hypoxia - Not/ Witnessed - The patient is complaining of day-time sleepiness(falling asleep easily/ narcolepsy) while driving or sitting still. Also patient complains of major tiredness/ fatigue, multiple episodes of night time awakenings(unexpalined). Also patient is at high risk for sleep apnea: overweight, small throat opening and enlarged (kissing) tonsils, sedentary lifestyle, sleeping aides. Recommend: Sleep studies: Nocturnal oxymetry, sleep lab. Consider CPAP vs. Oxygen per Nasal Canula at night at 2L/min. for nocturnal hypoxia          Thrombocytopenia (CMS-HCC) D69.6     Monitor CBC and Asymptomatic  now          Displacement of lumbar intervertebral disc without myelopathy M51.26     DDD - Degenerative disc disease of the Lumbo-Sacral (LS)/Cervical (C)  spine. Educate exercises and referred patient to Physical Therapy (PT). Ordered X-Ray's of the LS/C spine. Consider MRI. Radiculopathy in the distribution of L4-L5-S1/C3-C4-C5 nerve roots, needs  NSAIDS (Naprosin 500mg BID vs. Arthrotec 75mg BID or Prednisone taper (Medrol dose pack), Flexeril 10 mg qHS, heat application, and pain control with Tylenol          Other Visit Diagnoses         Codes    Megaloblastic anemia     D53.1    Relevant Orders    CBC and Auto Differential    Tobacco abuse, in remission     F17.201    Relevant Orders    CT lung screening low dose            Hypertension I10      Relevant Medications     allopurinol (Zyloprim) 300 mg tablet     Atherosclerosis of coronary artery of native heart - Primary I25.10     Relevant Orders     Nuclear Stress Test     Referral to Cardiology     Hypercholesterolemia E78.00       Hypercholesterolemia - Monitor lipid profile and educate patient upon risks of high cholesterol and targets. Educate diet and change in lifestyle and increase in exercises - Refill:  Repatha and Zetia  in light of his stent in Cx  and educate compliance with medication and diet.              Benign prostatic hyperplasia without lower urinary tract symptoms N40.0       BPH - Benign PROSTATIC Hypertrophy, + Dysuria/Nocturia, check PSA, prescribe Flomax 0.4mg qD and Avodart 0.5 mg qD            Controlled type 2 diabetes mellitus without complication, without long-term current use of insulin (Multi) E11.9       DM - NIDDM . Reviewed with patient / Will check HbA1c and fasting blood sugars. Educate home self monitoring and diary keeping(reviewed with patient home blood sugar levels /diary). Educate extensively low calorie diet and weight loss with exercise. Reviewed BS- diary and Rx. Regimen. [place] renal protection with ARB/ACEI. Educate compliance with diet and Rx. And educate risks and autcomes.            OBIE (obstructive sleep apnea) G47.33       Sleep apnea/nocturnal hypoxia - Not/ Witnessed - The patient is complaining of day-time sleepiness(falling asleep easily/  narcolepsy) while driving or sitting still. Also patient complains of major tiredness/ fatigue, multiple  episodes of night time awakenings(unexpalined). Also patient is at high risk for sleep apnea: overweight, small throat opening and enlarged (kissing) tonsils, sedentary lifestyle, sleeping aides. Recommend: Sleep studies: Nocturnal oxymetry, sleep lab. Consider CPAP vs. Oxygen per Nasal Canula at night at 2L/min. for nocturnal hypoxia            Benign essential hypertension I10       HTN - hypertension well/controlled .Target BP < 130/80 achieved. Educate low salt diet and exercise with weight loss. Educate home self monitoring and diary keeping. Educate risks of elevate blood pressure and benefits of prompt treatment.             Other Visit Diagnoses           Codes     Acute gout of foot, unspecified cause, unspecified laterality     M10.9     Relevant Medications     colchicine 0.6 mg tablet

## 2025-02-20 NOTE — ASSESSMENT & PLAN NOTE
Due to acute bronchitis and subsequenbt cough   Bronchitis with wheezing                                           Recommend:                                                                                                      mild  Pharyngitis,                                                                                                                                                                               Start-                                                                                                                                                             Asthma/COPD exacerbation       Allegra 180mg qD vs. Claritin 10 mg qD and Mucinex                                                                                                                                              Cough  - start Sinusitis - allergic vs. infectious - start Allegra and Flonase I BID and . and avoid cold exposure.

## 2025-02-21 LAB
BASOPHILS # BLD AUTO: 30 CELLS/UL (ref 0–200)
BASOPHILS NFR BLD AUTO: 0.5 %
EOSINOPHIL # BLD AUTO: 372 CELLS/UL (ref 15–500)
EOSINOPHIL NFR BLD AUTO: 6.3 %
ERYTHROCYTE [DISTWIDTH] IN BLOOD BY AUTOMATED COUNT: 13 % (ref 11–15)
HCT VFR BLD AUTO: 39.3 % (ref 38.5–50)
HGB BLD-MCNC: 13.2 G/DL (ref 13.2–17.1)
LYMPHOCYTES # BLD AUTO: 2519 CELLS/UL (ref 850–3900)
LYMPHOCYTES NFR BLD AUTO: 42.7 %
MCH RBC QN AUTO: 31.4 PG (ref 27–33)
MCHC RBC AUTO-ENTMCNC: 33.6 G/DL (ref 32–36)
MCV RBC AUTO: 93.6 FL (ref 80–100)
MONOCYTES # BLD AUTO: 478 CELLS/UL (ref 200–950)
MONOCYTES NFR BLD AUTO: 8.1 %
NEUTROPHILS # BLD AUTO: 2502 CELLS/UL (ref 1500–7800)
NEUTROPHILS NFR BLD AUTO: 42.4 %
PLATELET # BLD AUTO: 250 THOUSAND/UL (ref 140–400)
PMV BLD REES-ECKER: 10.1 FL (ref 7.5–12.5)
RBC # BLD AUTO: 4.2 MILLION/UL (ref 4.2–5.8)
WBC # BLD AUTO: 5.9 THOUSAND/UL (ref 3.8–10.8)

## 2025-03-04 ENCOUNTER — HOSPITAL ENCOUNTER (OUTPATIENT)
Dept: RADIOLOGY | Facility: CLINIC | Age: 60
Discharge: HOME | End: 2025-03-04
Payer: COMMERCIAL

## 2025-03-04 DIAGNOSIS — F17.201 TOBACCO ABUSE, IN REMISSION: ICD-10-CM

## 2025-03-04 PROCEDURE — 71271 CT THORAX LUNG CANCER SCR C-: CPT

## 2025-03-08 DIAGNOSIS — I10 HYPERTENSION, UNSPECIFIED TYPE: ICD-10-CM

## 2025-03-10 RX ORDER — AMLODIPINE BESYLATE 10 MG/1
10 TABLET ORAL DAILY
Qty: 90 TABLET | Refills: 0 | Status: SHIPPED | OUTPATIENT
Start: 2025-03-10

## 2025-03-12 ENCOUNTER — HOSPITAL ENCOUNTER (OUTPATIENT)
Dept: RADIOLOGY | Facility: HOSPITAL | Age: 60
Discharge: HOME | End: 2025-03-12
Payer: COMMERCIAL

## 2025-03-12 ENCOUNTER — HOSPITAL ENCOUNTER (OUTPATIENT)
Dept: CARDIOLOGY | Facility: HOSPITAL | Age: 60
Discharge: HOME | End: 2025-03-12
Payer: COMMERCIAL

## 2025-03-12 ENCOUNTER — APPOINTMENT (OUTPATIENT)
Dept: RADIOLOGY | Facility: HOSPITAL | Age: 60
End: 2025-03-12
Payer: COMMERCIAL

## 2025-03-12 DIAGNOSIS — I25.10 ATHEROSCLEROSIS OF CORONARY ARTERY OF NATIVE HEART, UNSPECIFIED VESSEL OR LESION TYPE, UNSPECIFIED WHETHER ANGINA PRESENT: ICD-10-CM

## 2025-03-12 PROCEDURE — 93017 CV STRESS TEST TRACING ONLY: CPT

## 2025-03-12 PROCEDURE — 93016 CV STRESS TEST SUPVJ ONLY: CPT | Performed by: INTERNAL MEDICINE

## 2025-03-12 PROCEDURE — 93018 CV STRESS TEST I&R ONLY: CPT | Performed by: INTERNAL MEDICINE

## 2025-03-12 PROCEDURE — 78452 HT MUSCLE IMAGE SPECT MULT: CPT

## 2025-03-12 PROCEDURE — 3430000001 HC RX 343 DIAGNOSTIC RADIOPHARMACEUTICALS: Performed by: INTERNAL MEDICINE

## 2025-03-12 PROCEDURE — A9502 TC99M TETROFOSMIN: HCPCS | Performed by: INTERNAL MEDICINE

## 2025-03-12 PROCEDURE — 78452 HT MUSCLE IMAGE SPECT MULT: CPT | Performed by: RADIOLOGY

## 2025-03-12 RX ADMIN — TETROFOSMIN 35.8 MILLICURIE: 0.23 INJECTION, POWDER, LYOPHILIZED, FOR SOLUTION INTRAVENOUS at 10:21

## 2025-03-12 RX ADMIN — TETROFOSMIN 11.2 MILLICURIE: 0.23 INJECTION, POWDER, LYOPHILIZED, FOR SOLUTION INTRAVENOUS at 08:40

## 2025-03-13 ENCOUNTER — TELEPHONE (OUTPATIENT)
Dept: PRIMARY CARE | Facility: CLINIC | Age: 60
End: 2025-03-13
Payer: COMMERCIAL

## 2025-03-21 DIAGNOSIS — J45.909 MODERATE ASTHMA, UNSPECIFIED WHETHER COMPLICATED, UNSPECIFIED WHETHER PERSISTENT (HHS-HCC): ICD-10-CM

## 2025-03-21 RX ORDER — ALBUTEROL SULFATE 90 UG/1
2 INHALANT RESPIRATORY (INHALATION) EVERY 6 HOURS PRN
Qty: 18 G | Refills: 2 | Status: SHIPPED | OUTPATIENT
Start: 2025-03-21

## 2025-04-01 ENCOUNTER — APPOINTMENT (OUTPATIENT)
Dept: PRIMARY CARE | Facility: CLINIC | Age: 60
End: 2025-04-01
Payer: COMMERCIAL

## 2025-04-01 VITALS
RESPIRATION RATE: 22 BRPM | OXYGEN SATURATION: 98 % | HEART RATE: 67 BPM | DIASTOLIC BLOOD PRESSURE: 75 MMHG | SYSTOLIC BLOOD PRESSURE: 132 MMHG | WEIGHT: 248 LBS | HEIGHT: 73 IN | BODY MASS INDEX: 32.87 KG/M2

## 2025-04-01 DIAGNOSIS — I10 PRIMARY HYPERTENSION: ICD-10-CM

## 2025-04-01 DIAGNOSIS — E11.9 CONTROLLED TYPE 2 DIABETES MELLITUS WITHOUT COMPLICATION, WITHOUT LONG-TERM CURRENT USE OF INSULIN: ICD-10-CM

## 2025-04-01 DIAGNOSIS — E78.00 HYPERCHOLESTEROLEMIA: ICD-10-CM

## 2025-04-01 DIAGNOSIS — G47.33 OSA (OBSTRUCTIVE SLEEP APNEA): ICD-10-CM

## 2025-04-01 DIAGNOSIS — D53.1 MEGALOBLASTIC ANEMIA: Primary | ICD-10-CM

## 2025-04-01 PROCEDURE — 99213 OFFICE O/P EST LOW 20 MIN: CPT | Performed by: INTERNAL MEDICINE

## 2025-04-01 PROCEDURE — 3008F BODY MASS INDEX DOCD: CPT | Performed by: INTERNAL MEDICINE

## 2025-04-01 PROCEDURE — 3075F SYST BP GE 130 - 139MM HG: CPT | Performed by: INTERNAL MEDICINE

## 2025-04-01 PROCEDURE — 3078F DIAST BP <80 MM HG: CPT | Performed by: INTERNAL MEDICINE

## 2025-04-01 ASSESSMENT — ENCOUNTER SYMPTOMS
ENDOCRINE NEGATIVE: 1
MUSCULOSKELETAL NEGATIVE: 1
PSYCHIATRIC NEGATIVE: 1
GASTROINTESTINAL NEGATIVE: 1
ALLERGIC/IMMUNOLOGIC NEGATIVE: 1
RESPIRATORY NEGATIVE: 1
NEUROLOGICAL NEGATIVE: 1
EYES NEGATIVE: 1
CONSTITUTIONAL NEGATIVE: 1
CARDIOVASCULAR NEGATIVE: 1
HEMATOLOGIC/LYMPHATIC NEGATIVE: 1

## 2025-04-01 NOTE — PROGRESS NOTES
"Subjective   Patient ID: Cb Clayton is a 59 y.o. male who presents for Follow-up (Follow up).    HPI     Review of Systems   Constitutional: Negative.    HENT: Negative.     Eyes: Negative.    Respiratory: Negative.     Cardiovascular: Negative.    Gastrointestinal: Negative.    Endocrine: Negative.    Musculoskeletal: Negative.    Skin: Negative.    Allergic/Immunologic: Negative.    Neurological: Negative.    Hematological: Negative.    Psychiatric/Behavioral: Negative.     All other systems reviewed and are negative.      Objective   /75   Pulse 67   Resp 22   Ht 1.854 m (6' 1\")   Wt 112 kg (248 lb)   SpO2 98%   BMI 32.72 kg/m²     Physical Exam  Vitals and nursing note reviewed.   Constitutional:       Appearance: Normal appearance.   HENT:      Head: Normocephalic and atraumatic.      Right Ear: Tympanic membrane, ear canal and external ear normal.      Left Ear: Tympanic membrane, ear canal and external ear normal. There is no impacted cerumen.      Nose: Nose normal.      Mouth/Throat:      Mouth: Mucous membranes are moist.      Pharynx: Oropharynx is clear.   Eyes:      Extraocular Movements: Extraocular movements intact.      Conjunctiva/sclera: Conjunctivae normal.      Pupils: Pupils are equal, round, and reactive to light.   Cardiovascular:      Rate and Rhythm: Normal rate and regular rhythm.      Pulses: Normal pulses.      Heart sounds: Normal heart sounds. No murmur heard.  Pulmonary:      Effort: Pulmonary effort is normal. No respiratory distress.      Breath sounds: Normal breath sounds. No stridor. No wheezing, rhonchi or rales.   Chest:      Chest wall: No tenderness.   Abdominal:      General: Abdomen is flat. Bowel sounds are normal. There is no distension.      Palpations: Abdomen is soft. There is no mass.      Tenderness: There is no abdominal tenderness. There is no right CVA tenderness, left CVA tenderness, guarding or rebound.      Hernia: No hernia is present. "   Musculoskeletal:         General: Normal range of motion.      Cervical back: Normal range of motion and neck supple.   Skin:     General: Skin is warm.      Capillary Refill: Capillary refill takes less than 2 seconds.   Neurological:      General: No focal deficit present.      Mental Status: He is alert.      Cranial Nerves: No cranial nerve deficit.      Sensory: No sensory deficit.      Motor: No weakness.      Coordination: Coordination normal.      Gait: Gait normal.      Deep Tendon Reflexes: Reflexes normal.   Psychiatric:         Mood and Affect: Mood normal.         Behavior: Behavior normal. Behavior is cooperative.         Thought Content: Thought content normal.         Judgment: Judgment normal.         Assessment/Plan   Problem List Items Addressed This Visit             ICD-10-CM    Hypertension I10     HTN - hypertension well/controlled .Target BP < 130/80  achieved. Educate low salt diet and exercise with weight loss. Educate home self monitoring and diary keeping. Educate risks of elevate blood pressure and benefits of prompt treatment.  Refill Lynda            Hypercholesterolemia E78.00     Hypercholesterolemia - Monitor lipid profile and educate patient upon risks of high cholesterol and targets. Educate diet and change in lifestyle and increase in exercises - Refill:   and educate compliance with medication and diet.           Controlled type 2 diabetes mellitus without complication, without long-term current use of insulin E11.9     DM - NIDDM . Reviewed with patient / Will check HbA1c and fasting blood sugars. Educate home self monitoring and diary keeping(reviewed with patient home blood sugar levels /diary). Educate extensively low calorie diet and weight loss with exercise. Reviewed BS- diary and Rx. Regimen. [place] renal protection with ARB/ACEI. Educate compliance with diet and Rx. And educate risks and autcomes          OBIE (obstructive sleep apnea) G47.33     Sleep  apnea/nocturnal hypoxia - Not/ Witnessed - The patient is complaining of day-time sleepiness(falling asleep easily/ narcolepsy) while driving or sitting still. Also patient complains of major tiredness/ fatigue, multiple episodes of night time awakenings(unexpalined). Also patient is at high risk for sleep apnea: overweight, small throat opening and enlarged (kissing) tonsils, sedentary lifestyle, sleeping aides. Recommend: Sleep studies: Nocturnal oxymetry, sleep lab. Consider CPAP vs. Oxygen per Nasal Canula at night at 2L/min. for nocturnal hypoxia           Other Visit Diagnoses         Codes    Megaloblastic anemia    -  Primary D53.1    Relevant Orders    CBC and Auto Differential            Hypertension I10        HTN - hypertension well/controlled .Target BP < 130/80  achieved. Educate low salt diet and exercise with weight loss. Educate home self monitoring and diary keeping. Educate risks of elevate blood pressure and benefits of prompt treatment.  Refill Lynda           Hypercholesterolemia E78.00       Hypercholesterolemia - Monitor lipid profile and educate patient upon risks of high cholesterol and targets. Educate diet and change in lifestyle and increase in exercises - Refill:   and educate compliance with medication and diet.  Last LDL 37 in September 2024. Statin intolerant, continue ezetimibe and Repatha. Discussed dietary changes            Lumbar post-laminectomy syndrome M96.1       DDD - Degenerative disc disease of the Lumbo-Sacral (LS)/Cervical (C) spine. Educate exercises and referred patient to Physical Therapy (PT). Ordered X-Ray's of the LS/C spine. Consider MRI. Radiculopathy in the distribution of L4-L5-S1/C3-C4-C5 nerve roots, needs NSAIDS (Naprosin 500mg BID vs. Arthrotec 75mg BID or Prednisone taper (Medrol dose pack), Flexeril 10 mg qHS, heat application, and pain control with Tylenol            Acquired hypothyroidism E03.9       Hypothyroidism - Symptoms well/not controlled  (weight gain, fatigue, constipation, cold intolerance). Check TSH continue/change dose of Synthroid/Levothyroxine  of  mcg/qD.            Relevant Orders     CBC and Auto Differential     Benign prostatic hyperplasia without lower urinary tract symptoms N40.0       BPH - Benign PROSTATIC Hypertrophy, + Dysuria/Nocturia, check PSA, Educate exercises and Change in life style, prescribe vitamin E 400 IU qD. Consider referral to urology.            Controlled type 2 diabetes mellitus without complication, without long-term current use of insulin (Multi) E11.9       DM - NIDDM . Reviewed with patient / Will check HbA1c and fasting blood sugars. Educate home self monitoring and diary keeping(reviewed with patient home blood sugar levels /diary). Educate extensively low calorie diet and weight loss with exercise. Reviewed BS- diary and Rx. Regimen. [place] renal protection with ARB/ACEI. Educate compliance with diet and Rx. And educate risks and autcomes                       OBIE (obstructive sleep apnea) G47.33       Sleep apnea/nocturnal hypoxia - Not/ Witnessed - The patient is complaining of day-time sleepiness(falling asleep easily/ narcolepsy) while driving or sitting still. Also patient complains of major tiredness/ fatigue, multiple episodes of night time awakenings(unexpalined). Also patient is at high risk for sleep apnea: overweight, small throat opening and enlarged (kissing) tonsils, sedentary lifestyle, sleeping aides. Recommend: Sleep studies: Nocturnal oxymetry, sleep lab. Consider CPAP vs. Oxygen per Nasal Canula at night at 2L/min. for nocturnal hypoxia            Thrombocytopenia (CMS-HCC) D69.6       Monitor CBC and Asymptomatic  now            Displacement of lumbar intervertebral disc without myelopathy M51.26       DDD - Degenerative disc disease of the Lumbo-Sacral (LS)/Cervical (C)  spine. Educate exercises and referred patient to Physical Therapy (PT). Ordered X-Ray's of the LS/C spine. Consider  MRI. Radiculopathy in the distribution of L4-L5-S1/C3-C4-C5 nerve roots, needs NSAIDS (Naprosin 500mg BID vs. Arthrotec 75mg BID or Prednisone taper (Medrol dose pack), Flexeril 10 mg qHS, heat application, and pain control with Tylenol            Other Visit Diagnoses           Codes     Megaloblastic anemia     D53.1     Relevant Orders     CBC and Auto Differential     Tobacco abuse, in remission     F17.201     Relevant Orders     CT lung screening low dose                      Hypertension I10       Relevant Medications     allopurinol (Zyloprim) 300 mg tablet     Atherosclerosis of coronary artery of native heart - Primary I25.10     Relevant Orders     Nuclear Stress Test     Referral to Cardiology     Hypercholesterolemia E78.00       Hypercholesterolemia - Monitor lipid profile and educate patient upon risks of high cholesterol and targets. Educate diet and change in lifestyle and increase in exercises - Refill:  Repatha and Zetia  in light of his stent in Cx  and educate compliance with medication and diet.              Benign prostatic hyperplasia without lower urinary tract symptoms N40.0       BPH - Benign PROSTATIC Hypertrophy, + Dysuria/Nocturia, check PSA, prescribe Flomax 0.4mg qD and Avodart 0.5 mg qD            Controlled type 2 diabetes mellitus without complication, without long-term current use of insulin (Multi) E11.9       DM - NIDDM . Reviewed with patient / Will check HbA1c and fasting blood sugars. Educate home self monitoring and diary keeping(reviewed with patient home blood sugar levels /diary). Educate extensively low calorie diet and weight loss with exercise. Reviewed BS- diary and Rx. Regimen. [place] renal protection with ARB/ACEI. Educate compliance with diet and Rx. And educate risks and autcomes.            OBIE (obstructive sleep apnea) G47.33       Sleep apnea/nocturnal hypoxia - Not/ Witnessed - The patient is complaining of day-time sleepiness(falling asleep easily/   narcolepsy) while driving or sitting still. Also patient complains of major tiredness/ fatigue, multiple episodes of night time awakenings(unexpalined). Also patient is at high risk for sleep apnea: overweight, small throat opening and enlarged (kissing) tonsils, sedentary lifestyle, sleeping aides. Recommend: Sleep studies: Nocturnal oxymetry, sleep lab. Consider CPAP vs. Oxygen per Nasal Canula at night at 2L/min. for nocturnal hypoxia            Benign essential hypertension I10       HTN - hypertension well/controlled .Target BP < 130/80 achieved. Educate low salt diet and exercise with weight loss. Educate home self monitoring and diary keeping. Educate risks of elevate blood pressure and benefits of prompt treatment.             Other Visit Diagnoses           Codes     Acute gout of foot, unspecified cause, unspecified laterality     M10.9     Relevant Medications     colchicine 0.6 mg tablet                Immunizations/Injections     Pfizer Purple Cap SARS-CoV-211/3/2021, 10/5/2021  Pneumococcal polysaccharide vaccine, 23-valent, age 2 years and older (PNEUMOVAX 23)8/17/2016  [facility] - Vaccine Adverse Event Reporting

## 2025-04-01 NOTE — ASSESSMENT & PLAN NOTE
----- Message from Max Mock sent at 11/16/2022  3:39 PM CST -----  Contact: Patient  Patient need a refill called in          Rx-   Tadalafil 20MG            [facility] - [place] - [phone] [place] [address]  Phone: [phone] Fax: [phone]        # for patient [phone]         DM - NIDDM . Reviewed with patient / Will check HbA1c and fasting blood sugars. Educate home self monitoring and diary keeping(reviewed with patient home blood sugar levels /diary). Educate extensively low calorie diet and weight loss with exercise. Reviewed BS- diary and Rx. Regimen. [place] renal protection with ARB/ACEI. Educate compliance with diet and Rx. And educate risks and autcomes

## 2025-04-03 ENCOUNTER — TELEPHONE (OUTPATIENT)
Dept: PRIMARY CARE | Facility: CLINIC | Age: 60
End: 2025-04-03
Payer: COMMERCIAL

## 2025-04-07 DIAGNOSIS — I25.118 CORONARY ARTERY DISEASE OF NATIVE ARTERY OF NATIVE HEART WITH STABLE ANGINA PECTORIS: ICD-10-CM

## 2025-04-07 DIAGNOSIS — E78.6 CHOLESTEROL DEPLETION: ICD-10-CM

## 2025-04-07 RX ORDER — EZETIMIBE 10 MG/1
10 TABLET ORAL DAILY
Qty: 90 TABLET | Refills: 0 | Status: SHIPPED | OUTPATIENT
Start: 2025-04-07

## 2025-04-07 RX ORDER — ASPIRIN 81 MG/1
81 TABLET ORAL DAILY
Qty: 90 TABLET | Refills: 0 | Status: SHIPPED | OUTPATIENT
Start: 2025-04-07

## 2025-04-25 LAB
BASOPHILS # BLD AUTO: 21 CELLS/UL (ref 0–200)
BASOPHILS NFR BLD AUTO: 0.4 %
EOSINOPHIL # BLD AUTO: 234 CELLS/UL (ref 15–500)
EOSINOPHIL NFR BLD AUTO: 4.5 %
ERYTHROCYTE [DISTWIDTH] IN BLOOD BY AUTOMATED COUNT: 12.6 % (ref 11–15)
HCT VFR BLD AUTO: 39.7 % (ref 38.5–50)
HGB BLD-MCNC: 13.1 G/DL (ref 13.2–17.1)
LYMPHOCYTES # BLD AUTO: 2226 CELLS/UL (ref 850–3900)
LYMPHOCYTES NFR BLD AUTO: 42.8 %
MCH RBC QN AUTO: 30.9 PG (ref 27–33)
MCHC RBC AUTO-ENTMCNC: 33 G/DL (ref 32–36)
MCV RBC AUTO: 93.6 FL (ref 80–100)
MONOCYTES # BLD AUTO: 484 CELLS/UL (ref 200–950)
MONOCYTES NFR BLD AUTO: 9.3 %
NEUTROPHILS # BLD AUTO: 2236 CELLS/UL (ref 1500–7800)
NEUTROPHILS NFR BLD AUTO: 43 %
PLATELET # BLD AUTO: 246 THOUSAND/UL (ref 140–400)
PMV BLD REES-ECKER: 9.1 FL (ref 7.5–12.5)
RBC # BLD AUTO: 4.24 MILLION/UL (ref 4.2–5.8)
WBC # BLD AUTO: 5.2 THOUSAND/UL (ref 3.8–10.8)

## 2025-05-27 ENCOUNTER — APPOINTMENT (OUTPATIENT)
Dept: PRIMARY CARE | Facility: CLINIC | Age: 60
End: 2025-05-27
Payer: COMMERCIAL

## 2025-07-01 ENCOUNTER — APPOINTMENT (OUTPATIENT)
Dept: PRIMARY CARE | Facility: CLINIC | Age: 60
End: 2025-07-01
Payer: COMMERCIAL

## 2025-07-07 DIAGNOSIS — I25.118 CORONARY ARTERY DISEASE OF NATIVE ARTERY OF NATIVE HEART WITH STABLE ANGINA PECTORIS: ICD-10-CM

## 2025-07-07 DIAGNOSIS — E78.6 CHOLESTEROL DEPLETION: ICD-10-CM

## 2025-07-08 RX ORDER — EZETIMIBE 10 MG/1
10 TABLET ORAL DAILY
Qty: 90 TABLET | Refills: 0 | Status: SHIPPED | OUTPATIENT
Start: 2025-07-08

## 2025-07-08 RX ORDER — ASPIRIN 81 MG/1
81 TABLET ORAL DAILY
Qty: 90 TABLET | Refills: 0 | Status: SHIPPED | OUTPATIENT
Start: 2025-07-08

## 2025-07-14 ENCOUNTER — OFFICE VISIT (OUTPATIENT)
Dept: FAMILY MEDICINE | Facility: OTHER | Age: 60
End: 2025-07-14
Payer: COMMERCIAL

## 2025-07-14 VITALS
RESPIRATION RATE: 19 BRPM | HEIGHT: 70 IN | TEMPERATURE: 98.3 F | HEART RATE: 78 BPM | BODY MASS INDEX: 31.21 KG/M2 | WEIGHT: 218 LBS | OXYGEN SATURATION: 98 % | DIASTOLIC BLOOD PRESSURE: 78 MMHG | SYSTOLIC BLOOD PRESSURE: 132 MMHG

## 2025-07-14 DIAGNOSIS — B00.89 HERPETIC WHITLOW: Primary | ICD-10-CM

## 2025-07-14 PROCEDURE — 1126F AMNT PAIN NOTED NONE PRSNT: CPT

## 2025-07-14 PROCEDURE — 3078F DIAST BP <80 MM HG: CPT

## 2025-07-14 PROCEDURE — 3075F SYST BP GE 130 - 139MM HG: CPT

## 2025-07-14 PROCEDURE — 99213 OFFICE O/P EST LOW 20 MIN: CPT

## 2025-07-14 RX ORDER — VALACYCLOVIR HYDROCHLORIDE 1 G/1
1000 TABLET, FILM COATED ORAL 2 TIMES DAILY
Qty: 20 TABLET | Refills: 0 | Status: SHIPPED | OUTPATIENT
Start: 2025-07-14 | End: 2025-07-24

## 2025-07-14 ASSESSMENT — ENCOUNTER SYMPTOMS
FEVER: 0
CHILLS: 0
CARDIOVASCULAR NEGATIVE: 1
RESPIRATORY NEGATIVE: 1

## 2025-07-14 ASSESSMENT — PAIN SCALES - GENERAL: PAINLEVEL_OUTOF10: NO PAIN (0)

## 2025-07-14 NOTE — PROGRESS NOTES
Narendra Dee  1965    ASSESSMENT/PLAN      1. Herpetic aleksandra (Primary)  - valACYclovir (VALTREX) 1000 mg tablet; Take 1 tablet (1,000 mg) by mouth 2 times daily for 10 days.  Dispense: 20 tablet; Refill: 0    - Valtrex twice daily for 10 days sent to pharmacy for signs symptoms consistent with herpetic window. Differential includes herpes zoster.   - Discussed that the effectiveness of Valtrex can vary after initial 72 hours of onset.  - May use over-the-counter Tylenol or ibuprofen PRN  - Follow up as needed for new or worsening symptoms          *Explanation of diagnosis, treatment options and risk and benefits of medications reviewed with patient. Patient agrees with plan of care.  *All questions were answered.    *Red flags symptoms were discussed and patient was advised when they should return for reevaluation or for prompt emergency evaluation.   *Patient was given verbal and written instructions on plan of care. Instructions were printed or are available on [employer] on electronic AVS.   *We discussed potential side effects of any prescribed or recommended therapies, as well as expectations for response to treatments.  *Patient discharged in stable condition    Josafat Alberto, DAVIDA, APRN, FNP-C  [facility]    SUBJECTIVE  CHIEF COMPLAINT/ REASON FOR VISIT  Patient presents with:  Shingles     HISTORY OF PRESENT ILLNESS  Narendra Dee is a pleasant 59 year old male [facility] with [facility]. Over the last week patient had an eruption of rash to right wrist and right hand. This rash is painful and has vesicles. He's noted new lesions on two fingers of right hand which burn and are painful. He denies a history of HSV. Ibuprofen and tylenol have helped the pain. He denies fever or joint pain. He does endorse history of chickenpox as child.     History provided by patient       I have reviewed the nursing notes.  I have reviewed allergies, medication list, problem  "list, and past medical history.    REVIEW OF SYSTEMS  Review of Systems   Constitutional:  Negative for chills and fever.   HENT: Negative.     Respiratory: Negative.     Cardiovascular: Negative.    Skin:  Positive for rash.        VITAL SIGNS  Vitals:    07/14/25 1708   BP: 132/78   BP Location: Left arm   Patient Position: Sitting   Cuff Size: Adult Regular   Pulse: 78   Resp: 19   Temp: 98.3  F (36.8  C)   TempSrc: Tympanic   SpO2: 98%   Weight: 98.9 kg (218 lb)   Height: 1.778 m (5' 10\")      Body mass index is 31.28 kg/m .      OBJECTIVE  PHYSICAL EXAM  Physical Exam  Vitals and nursing note reviewed.   Constitutional:       General: He is not in acute distress.     Appearance: Normal appearance. He is normal weight. He is not ill-appearing, toxic-appearing or diaphoretic.   Cardiovascular:      Rate and Rhythm: Normal rate and regular rhythm.      Pulses: Normal pulses.      Heart sounds: Normal heart sounds.   Pulmonary:      Effort: Pulmonary effort is normal. No respiratory distress.      Breath sounds: Normal breath sounds. No wheezing or rhonchi.   Skin:     General: Skin is warm and dry.      Capillary Refill: Capillary refill takes less than 2 seconds.      Findings: Rash present.             Comments: Clustered vesicular rash to right wrist, index finger, and fourth finger.   Neurological:      Mental Status: He is alert.            DIAGNOSTICS  No results found for any visits on 07/14/25.   "

## 2025-07-14 NOTE — PROGRESS NOTES
"Chief Complaint   Patient presents with    Shingles   Patient is here to be seen for rash on arm and hand.    FOOD SECURITY SCREENING QUESTIONS  Hunger Vital Signs:  Within the past 12 months we worried whether our food would run out before we got money to buy more. Never  Within the past 12 months the food we bought just didn't last and we didn't have money to get more. Never  Lorrie Sebastian 7/14/2025 5:10 PM      Initial /78 (BP Location: Left arm, Patient Position: Sitting, Cuff Size: Adult Regular)   Pulse 78   Temp 98.3  F (36.8  C) (Tympanic)   Resp 19   Ht 1.778 m (5' 10\")   Wt 98.9 kg (218 lb)   SpO2 98%   BMI 31.28 kg/m   Estimated body mass index is 31.28 kg/m  as calculated from the following:    Height as of this encounter: 1.778 m (5' 10\").    Weight as of this encounter: 98.9 kg (218 lb).  Medication Reconciliation: complete    Lorrie Sebastian   "

## 2025-09-04 ENCOUNTER — PATIENT OUTREACH (OUTPATIENT)
Dept: CARE COORDINATION | Facility: CLINIC | Age: 60
End: 2025-09-04
Payer: COMMERCIAL

## 2025-11-04 ENCOUNTER — APPOINTMENT (OUTPATIENT)
Dept: PRIMARY CARE | Facility: CLINIC | Age: 60
End: 2025-11-04
Payer: COMMERCIAL

## 2025-11-25 ENCOUNTER — APPOINTMENT (OUTPATIENT)
Dept: PRIMARY CARE | Facility: CLINIC | Age: 60
End: 2025-11-25
Payer: COMMERCIAL